# Patient Record
Sex: MALE | Race: WHITE | NOT HISPANIC OR LATINO
[De-identification: names, ages, dates, MRNs, and addresses within clinical notes are randomized per-mention and may not be internally consistent; named-entity substitution may affect disease eponyms.]

---

## 2021-01-15 ENCOUNTER — TRANSCRIPTION ENCOUNTER (OUTPATIENT)
Age: 86
End: 2021-01-15

## 2021-02-12 ENCOUNTER — TRANSCRIPTION ENCOUNTER (OUTPATIENT)
Age: 86
End: 2021-02-12

## 2023-02-03 ENCOUNTER — INPATIENT (INPATIENT)
Facility: HOSPITAL | Age: 88
LOS: 1 days | DRG: 308 | End: 2023-02-05
Attending: INTERNAL MEDICINE | Admitting: INTERNAL MEDICINE
Payer: MEDICARE

## 2023-02-03 VITALS
SYSTOLIC BLOOD PRESSURE: 116 MMHG | DIASTOLIC BLOOD PRESSURE: 73 MMHG | HEART RATE: 81 BPM | RESPIRATION RATE: 15 BRPM | OXYGEN SATURATION: 100 %

## 2023-02-03 DIAGNOSIS — I46.9 CARDIAC ARREST, CAUSE UNSPECIFIED: ICD-10-CM

## 2023-02-03 LAB
ALBUMIN SERPL ELPH-MCNC: 3.4 G/DL — SIGNIFICANT CHANGE UP (ref 3.3–5)
ALP SERPL-CCNC: 72 U/L — SIGNIFICANT CHANGE UP (ref 40–120)
ALT FLD-CCNC: 49 U/L — HIGH (ref 10–45)
ANION GAP SERPL CALC-SCNC: 23 MMOL/L — HIGH (ref 5–17)
APTT BLD: 30.5 SEC — SIGNIFICANT CHANGE UP (ref 27.5–35.5)
AST SERPL-CCNC: 99 U/L — HIGH (ref 10–40)
BASE EXCESS BLDV CALC-SCNC: -11.5 MMOL/L — LOW (ref -2–3)
BASE EXCESS BLDV CALC-SCNC: -6.4 MMOL/L — LOW (ref -2–3)
BASOPHILS # BLD AUTO: 0.1 K/UL — SIGNIFICANT CHANGE UP (ref 0–0.2)
BASOPHILS NFR BLD AUTO: 0.7 % — SIGNIFICANT CHANGE UP (ref 0–2)
BILIRUB SERPL-MCNC: 0.7 MG/DL — SIGNIFICANT CHANGE UP (ref 0.2–1.2)
BUN SERPL-MCNC: 39 MG/DL — HIGH (ref 7–23)
CA-I SERPL-SCNC: 1.08 MMOL/L — LOW (ref 1.15–1.33)
CA-I SERPL-SCNC: 1.18 MMOL/L — SIGNIFICANT CHANGE UP (ref 1.15–1.33)
CALCIUM SERPL-MCNC: 9.3 MG/DL — SIGNIFICANT CHANGE UP (ref 8.4–10.5)
CHLORIDE BLDV-SCNC: 93 MMOL/L — LOW (ref 96–108)
CHLORIDE BLDV-SCNC: 94 MMOL/L — LOW (ref 96–108)
CHLORIDE SERPL-SCNC: 96 MMOL/L — SIGNIFICANT CHANGE UP (ref 96–108)
CO2 BLDV-SCNC: 22 MMOL/L — SIGNIFICANT CHANGE UP (ref 22–26)
CO2 BLDV-SCNC: 24 MMOL/L — SIGNIFICANT CHANGE UP (ref 22–26)
CO2 SERPL-SCNC: 16 MMOL/L — LOW (ref 22–31)
CREAT SERPL-MCNC: 1.84 MG/DL — HIGH (ref 0.5–1.3)
EGFR: 35 ML/MIN/1.73M2 — LOW
EOSINOPHIL # BLD AUTO: 0.22 K/UL — SIGNIFICANT CHANGE UP (ref 0–0.5)
EOSINOPHIL NFR BLD AUTO: 1.4 % — SIGNIFICANT CHANGE UP (ref 0–6)
GAS PNL BLDV: 123 MMOL/L — LOW (ref 136–145)
GAS PNL BLDV: 132 MMOL/L — LOW (ref 136–145)
GAS PNL BLDV: SIGNIFICANT CHANGE UP
GLUCOSE BLDV-MCNC: 266 MG/DL — HIGH (ref 70–99)
GLUCOSE BLDV-MCNC: 313 MG/DL — HIGH (ref 70–99)
GLUCOSE SERPL-MCNC: 247 MG/DL — HIGH (ref 70–99)
HCO3 BLDV-SCNC: 20 MMOL/L — LOW (ref 22–29)
HCO3 BLDV-SCNC: 22 MMOL/L — SIGNIFICANT CHANGE UP (ref 22–29)
HCT VFR BLD CALC: 40.9 % — SIGNIFICANT CHANGE UP (ref 39–50)
HCT VFR BLDA CALC: 37 % — LOW (ref 39–51)
HCT VFR BLDA CALC: 40 % — SIGNIFICANT CHANGE UP (ref 39–51)
HGB BLD CALC-MCNC: 12.3 G/DL — LOW (ref 12.6–17.4)
HGB BLD CALC-MCNC: 13.3 G/DL — SIGNIFICANT CHANGE UP (ref 12.6–17.4)
HGB BLD-MCNC: 12.4 G/DL — LOW (ref 13–17)
IMM GRANULOCYTES NFR BLD AUTO: 1.8 % — HIGH (ref 0–0.9)
INR BLD: 1.66 RATIO — HIGH (ref 0.88–1.16)
LACTATE BLDV-MCNC: 10.5 MMOL/L — CRITICAL HIGH (ref 0.5–2)
LACTATE BLDV-MCNC: 7.6 MMOL/L — CRITICAL HIGH (ref 0.5–2)
LYMPHOCYTES # BLD AUTO: 21.6 % — SIGNIFICANT CHANGE UP (ref 13–44)
LYMPHOCYTES # BLD AUTO: 3.31 K/UL — HIGH (ref 1–3.3)
MCHC RBC-ENTMCNC: 30.3 GM/DL — LOW (ref 32–36)
MCHC RBC-ENTMCNC: 30.3 PG — SIGNIFICANT CHANGE UP (ref 27–34)
MCV RBC AUTO: 100 FL — SIGNIFICANT CHANGE UP (ref 80–100)
MONOCYTES # BLD AUTO: 1.48 K/UL — HIGH (ref 0–0.9)
MONOCYTES NFR BLD AUTO: 9.7 % — SIGNIFICANT CHANGE UP (ref 2–14)
NEUTROPHILS # BLD AUTO: 9.92 K/UL — HIGH (ref 1.8–7.4)
NEUTROPHILS NFR BLD AUTO: 64.8 % — SIGNIFICANT CHANGE UP (ref 43–77)
NRBC # BLD: 0 /100 WBCS — SIGNIFICANT CHANGE UP (ref 0–0)
PCO2 BLDV: 57 MMHG — HIGH (ref 42–55)
PCO2 BLDV: 69 MMHG — HIGH (ref 42–55)
PH BLDV: 7.06 — CRITICAL LOW (ref 7.32–7.43)
PH BLDV: 7.2 — LOW (ref 7.32–7.43)
PLATELET # BLD AUTO: 279 K/UL — SIGNIFICANT CHANGE UP (ref 150–400)
PO2 BLDV: 46 MMHG — HIGH (ref 25–45)
PO2 BLDV: 47 MMHG — HIGH (ref 25–45)
POTASSIUM BLDV-SCNC: 4.3 MMOL/L — SIGNIFICANT CHANGE UP (ref 3.5–5.1)
POTASSIUM BLDV-SCNC: >10 MMOL/L — CRITICAL HIGH (ref 3.5–5.1)
POTASSIUM SERPL-MCNC: 3.4 MMOL/L — LOW (ref 3.5–5.3)
POTASSIUM SERPL-SCNC: 3.4 MMOL/L — LOW (ref 3.5–5.3)
PROT SERPL-MCNC: 6.6 G/DL — SIGNIFICANT CHANGE UP (ref 6–8.3)
PROTHROM AB SERPL-ACNC: 19.3 SEC — HIGH (ref 10.5–13.4)
RAPID RVP RESULT: SIGNIFICANT CHANGE UP
RBC # BLD: 4.09 M/UL — LOW (ref 4.2–5.8)
RBC # FLD: 16.6 % — HIGH (ref 10.3–14.5)
SAO2 % BLDV: 60.2 % — LOW (ref 67–88)
SAO2 % BLDV: 67 % — SIGNIFICANT CHANGE UP (ref 67–88)
SARS-COV-2 RNA SPEC QL NAA+PROBE: SIGNIFICANT CHANGE UP
SODIUM SERPL-SCNC: 135 MMOL/L — SIGNIFICANT CHANGE UP (ref 135–145)
WBC # BLD: 15.3 K/UL — HIGH (ref 3.8–10.5)
WBC # FLD AUTO: 15.3 K/UL — HIGH (ref 3.8–10.5)

## 2023-02-03 PROCEDURE — 36620 INSERTION CATHETER ARTERY: CPT

## 2023-02-03 PROCEDURE — 71045 X-RAY EXAM CHEST 1 VIEW: CPT | Mod: 26,76

## 2023-02-03 PROCEDURE — 31500 INSERT EMERGENCY AIRWAY: CPT

## 2023-02-03 PROCEDURE — 93010 ELECTROCARDIOGRAM REPORT: CPT

## 2023-02-03 PROCEDURE — 99291 CRITICAL CARE FIRST HOUR: CPT | Mod: 25

## 2023-02-03 RX ORDER — PROPOFOL 10 MG/ML
25 INJECTION, EMULSION INTRAVENOUS
Qty: 1000 | Refills: 0 | Status: DISCONTINUED | OUTPATIENT
Start: 2023-02-03 | End: 2023-02-05

## 2023-02-03 RX ORDER — FENTANYL CITRATE 50 UG/ML
0.5 INJECTION INTRAVENOUS
Qty: 5000 | Refills: 0 | Status: DISCONTINUED | OUTPATIENT
Start: 2023-02-03 | End: 2023-02-03

## 2023-02-03 RX ORDER — LIDOCAINE HCL 20 MG/ML
1 VIAL (ML) INJECTION
Qty: 2 | Refills: 0 | Status: DISCONTINUED | OUTPATIENT
Start: 2023-02-03 | End: 2023-02-05

## 2023-02-03 RX ORDER — CHLORHEXIDINE GLUCONATE 213 G/1000ML
15 SOLUTION TOPICAL EVERY 12 HOURS
Refills: 0 | Status: DISCONTINUED | OUTPATIENT
Start: 2023-02-03 | End: 2023-02-05

## 2023-02-03 RX ORDER — CHLORHEXIDINE GLUCONATE 213 G/1000ML
1 SOLUTION TOPICAL AT BEDTIME
Refills: 0 | Status: DISCONTINUED | OUTPATIENT
Start: 2023-02-03 | End: 2023-02-05

## 2023-02-03 RX ORDER — FENTANYL CITRATE 50 UG/ML
50 INJECTION INTRAVENOUS ONCE
Refills: 0 | Status: DISCONTINUED | OUTPATIENT
Start: 2023-02-03 | End: 2023-02-03

## 2023-02-03 RX ADMIN — FENTANYL CITRATE 2 MICROGRAM(S)/KG/HR: 50 INJECTION INTRAVENOUS at 20:28

## 2023-02-03 RX ADMIN — FENTANYL CITRATE 50 MICROGRAM(S): 50 INJECTION INTRAVENOUS at 20:35

## 2023-02-03 RX ADMIN — Medication 7.5 MG/MIN: at 23:55

## 2023-02-03 NOTE — ED PROVIDER NOTE - PHYSICAL EXAMINATION
General: intubated, withdraws to painful stimuli   Skin: no rash, no pallor  Head: normocephalic, atraumatic  Eyes: clear conjunctiva, pupils fixed, 4 mm b/l   ENMT: intubated, no nasal discharge  Cardiovascular: normal rate, normal rhythm, S1/S2  Pulmonary: absent R side breath sounds   Abdomen: soft, nondistended  Musculoskeletal: moving extremities from painful stimuli, no deformity  Psych: intubated, not verbally responsive

## 2023-02-03 NOTE — ED PROVIDER NOTE - PROGRESS NOTE DETAILS
Mason Recio, PGY-3- micu and ccu consulted. pt s/p vifb arrest and accepted to ccu. ct head/cta chest pending

## 2023-02-03 NOTE — H&P ADULT - HISTORY OF PRESENT ILLNESS
87 year old man with history of CHF, s/p PPM, DM brought in by EMS following cardiac arrest at home. Was seated in chair when he suddenly collapsed as witnessed by his wife and was noted to not be breathing or have a pulse. Wife then started CPR, EMS was contacted and upon arrival was assessed to be in VF. Was shocked twice, given three rounds of epi and had ROSC. Intubated in the field and arrived at the ED with pulse. Per family patient also recently admitted to [facility] for HF exacerbation.     In the ED, had ETT changed and was then noted to not have lung sounds on the right side. Was found to have moderate sized pneumo on CXR, now s/p right-sided chest tube with reinflation of lung seen on CXR. Patient sedated on fent and admitted to CICU for further management s/p VF arrest.   87 year old man with history of HFrEF last EF 40-45%, Afib/Aflutter s/p ablation, high grade AV block s/p PPM, T2DM, CKD brought in by EMS following cardiac arrest at home. Was seated in chair when he suddenly collapsed as witnessed by his wife and was noted to not be breathing or have a pulse. Wife then started CPR, EMS was contacted and upon arrival was assessed to be in VF. Was shocked twice, given three rounds of epi and had ROSC. Intubated in the field and arrived at the ED with pulse. Per family patient also recently admitted to [facility] for HF exacerbation, found to have PNA for which he was treated. Per wife, patient had checked his blood sugar prior to arrest and was 135.     In the ED, had ETT changed and was then noted to not have lung sounds on the right side. Was found to have moderate sized pneumo on CXR, now s/p right-sided chest tube with reinflation of lung seen on CXR. Patient sedated on fent and admitted to CICU for further management s/p VF arrest.   87 year old man with history of HFrEF last EF 40-45%, Afib/Aflutter s/p ablation, high grade AV block s/p PPM, T2DM, CKD brought in by EMS following cardiac arrest at home. Was seated in chair when he suddenly collapsed as witnessed by his wife and was noted to not be breathing or have a pulse. Wife then started CPR, EMS was contacted and upon arrival was assessed to be in VF. Was shocked twice, given three rounds of epi and had ROSC. Intubated in the field and arrived at the ED with pulse. Per family patient also recently admitted to [facility] for HF exacerbation, found to have PNA for which he was treated. Per wife, patient had checked his blood sugar prior to arrest and was 135. Patient's wife believes she called for EMS maybe 5-10 minutes after he went unresponsive.    In the ED, had ETT changed and was then noted to not have lung sounds on the right side. Was found to have moderate sized pneumo on CXR, now s/p right-sided chest tube with reinflation of lung seen on CXR. Patient was sedated on fent and admitted to CICU for further management s/p VF arrest.   87 year old man with history of HFrEF last EF 40-45%, Afib/Aflutter s/p ablation, high grade AV block s/p PPM, T2DM, CKD brought in by EMS following cardiac arrest at home. Was seated in chair when he suddenly collapsed as witnessed by his wife and was noted to not be breathing or have a pulse. Per wife he slumped in chair, did not fall and hit his head. Wife started CPR, EMS was contacted and upon arrival was assessed to be in VF. Was shocked twice, given three rounds of epi and had ROSC. Intubated in the field and arrived at the ED with pulse. Per family patient also recently admitted to [facility] for HF exacerbation, found to have PNA for which he was treated. Per wife, patient had checked his blood sugar prior to arrest and was 135. Patient's wife believes she called for EMS maybe 5-10 minutes after he went unresponsive.    In the ED, had ETT changed and was then noted to not have lung sounds on the right side. Was found to have moderate sized pneumo on CXR, now s/p right-sided chest tube with reinflation of lung seen on CXR. Patient was sedated on fent and admitted to CICU for further management s/p VF arrest.   87 year old man with history of HFrEF last EF 40-45%, Afib/Aflutter s/p ablation, high grade AV block s/p PPM, T2DM, CKD brought in by EMS following cardiac arrest at home. Was seated in chair when he suddenly collapsed as witnessed by his wife and was noted to not be breathing or have a pulse. Per wife he slumped in chair, did not fall and hit his head. Wife started CPR, EMS was contacted and upon arrival was assessed to be in VF. Was shocked twice, given three rounds of epi and had ROSC. Intubated in the field and arrived at the ED with pulse. Per family patient also recently admitted to [facility] for HF exacerbation, found to have PNA for which he was treated. Per wife, patient had checked his blood sugar prior to arrest and was 135. Patient's wife believes she called for EMS maybe 5-10 minutes after he went unresponsive.    In the ED, noted to have /73, HR 81, 100% saturation. Elevated WBC to 15.3, HAGMA with lactate of 10.5 on VBG, Pro-BNP of 5819. He had ETT changed and was then noted to not have lung sounds on the right side. Was found to have moderate sized pneumo on CXR, now s/p right-sided chest tube with reinflation of lung seen on CXR. Patient was sedated on fent and admitted to CICU for further management s/p VF arrest.

## 2023-02-03 NOTE — H&P ADULT - NSHPPHYSICALEXAM_GEN_ALL_CORE
Vital Signs Last 24 Hrs  T(C): 37.1 (03 Feb 2023 21:06), Max: 37.1 (03 Feb 2023 21:06)  T(F): 98.8 (03 Feb 2023 21:06), Max: 98.8 (03 Feb 2023 21:06)  HR: 106 (03 Feb 2023 21:06) (81 - 106)  BP: 136/87 (03 Feb 2023 21:06) (116/73 - 136/87)  BP(mean): 99 (03 Feb 2023 21:06) (87 - 108)  RR: 18 (03 Feb 2023 21:06) (15 - 26)  SpO2: 100% (03 Feb 2023 21:06) (95% - 100%)    Parameters below as of 03 Feb 2023 21:06  Patient On (Oxygen Delivery Method): ventilator    CONSTITUTIONAL: NAD, well-developed, well-groomed  HEENT: Moist oral mucosa, no pharyngeal injection or exudates  RESPIRATORY: Normal respiratory effort, lungs are clear to auscultation bilaterally  CARDIOVASCULAR: Regular rate and rhythm, normal S1 and S2, no murmur/rub/gallop, no lower extremity edema, peripheral pulses are 2+ bilaterally  ABDOMEN: Soft, nondistended, nontender to palpation, normoactive bowel sounds, no rebound/guarding  PSYCH: A+O to person, place, and time  NEUROLOGY: Facial expression symmetric, no gross sensory deficits appreciated, moves all extremities spontaneously  SKIN: No rashes, no palpable lesions Vital Signs Last 24 Hrs  T(C): 37.1 (03 Feb 2023 21:06), Max: 37.1 (03 Feb 2023 21:06)  T(F): 98.8 (03 Feb 2023 21:06), Max: 98.8 (03 Feb 2023 21:06)  HR: 106 (03 Feb 2023 21:06) (81 - 106)  BP: 136/87 (03 Feb 2023 21:06) (116/73 - 136/87)  BP(mean): 99 (03 Feb 2023 21:06) (87 - 108)  RR: 18 (03 Feb 2023 21:06) (15 - 26)  SpO2: 100% (03 Feb 2023 21:06) (95% - 100%)    Parameters below as of 03 Feb 2023 21:06  Patient On (Oxygen Delivery Method): ventilator    CONSTITUTIONAL: Sedated  HEENT: ETT in place  RESPIRATORY: Intubated, ventilated. Lung sounds bilaterally  CARDIOVASCULAR: Regular rate and rhythm, normal S1 and S2, no murmur/rub/gallop, no lower extremity edema, peripheral pulses are 1+ bilaterally  ABDOMEN: Soft, nondistended, nontender to palpation, normoactive bowel sounds, no rebound/guarding  PSYCH: Sedated  NEUROLOGY: Sedated, intermittent jerking of upper extremities  SKIN: No rashes, no palpable lesions Vital Signs Last 24 Hrs  T(C): 37.1 (03 Feb 2023 21:06), Max: 37.1 (03 Feb 2023 21:06)  T(F): 98.8 (03 Feb 2023 21:06), Max: 98.8 (03 Feb 2023 21:06)  HR: 106 (03 Feb 2023 21:06) (81 - 106)  BP: 136/87 (03 Feb 2023 21:06) (116/73 - 136/87)  BP(mean): 99 (03 Feb 2023 21:06) (87 - 108)  RR: 18 (03 Feb 2023 21:06) (15 - 26)  SpO2: 100% (03 Feb 2023 21:06) (95% - 100%)    Parameters below as of 03 Feb 2023 21:06  Patient On (Oxygen Delivery Method): ventilator    CONSTITUTIONAL: Sedated  HEENT: ETT in place  RESPIRATORY: Intubated, ventilated. Lung sounds bilaterally, mildly diminished still on right side. Chest tube on right side  CARDIOVASCULAR: Irregular rhythm, normal S1 and S2, no murmur/rub/gallop, no lower extremity edema appreciated, peripheral pulses are 1+ bilaterally  ABDOMEN: Soft, nondistended, bowel sounds heard, no rebound/guarding  PSYCH: Sedated  NEUROLOGY: Sedated, initially with intermittent jerking of upper extremities  SKIN: No rashes, no palpable lesions Vital Signs Last 24 Hrs  T(C): 37.1 (03 Feb 2023 21:06), Max: 37.1 (03 Feb 2023 21:06)  T(F): 98.8 (03 Feb 2023 21:06), Max: 98.8 (03 Feb 2023 21:06)  HR: 106 (03 Feb 2023 21:06) (81 - 106)  BP: 136/87 (03 Feb 2023 21:06) (116/73 - 136/87)  BP(mean): 99 (03 Feb 2023 21:06) (87 - 108)  RR: 18 (03 Feb 2023 21:06) (15 - 26)  SpO2: 100% (03 Feb 2023 21:06) (95% - 100%)    Parameters below as of 03 Feb 2023 21:06  Patient On (Oxygen Delivery Method): ventilator    CONSTITUTIONAL: Sedated  HEENT: ETT in place  RESPIRATORY: Intubated, ventilated. Lung sounds bilaterally, mildly diminished still on right side. Pigtail catheter on right side  CARDIOVASCULAR: Irregular rhythm, normal S1 and S2, no murmur/rub/gallop, no lower extremity edema appreciated, peripheral pulses are 1+ bilaterally  ABDOMEN: Soft, nondistended, bowel sounds heard, no rebound/guarding  PSYCH: Sedated  NEUROLOGY: Sedated, initially with intermittent jerking of upper extremities  SKIN: No rashes, no palpable lesions Vital Signs Last 24 Hrs  T(C): 37.1 (03 Feb 2023 21:06), Max: 37.1 (03 Feb 2023 21:06)  T(F): 98.8 (03 Feb 2023 21:06), Max: 98.8 (03 Feb 2023 21:06)  HR: 106 (03 Feb 2023 21:06) (81 - 106)  BP: 136/87 (03 Feb 2023 21:06) (116/73 - 136/87)  BP(mean): 99 (03 Feb 2023 21:06) (87 - 108)  RR: 18 (03 Feb 2023 21:06) (15 - 26)  SpO2: 100% (03 Feb 2023 21:06) (95% - 100%)    Parameters below as of 03 Feb 2023 21:06  Patient On (Oxygen Delivery Method): ventilator    CONSTITUTIONAL: Sedated  HEENT: ETT in place  RESPIRATORY: Intubated, ventilated. Lung sounds bilaterally, mildly diminished still on right side. Pigtail catheter at right chest wall  CARDIOVASCULAR: Irregular rhythm, normal S1 and S2, no murmur/rub/gallop, no lower extremity edema appreciated, peripheral pulses are 1+ bilaterally  ABDOMEN: Soft, nondistended, bowel sounds heard, no rebound/guarding  PSYCH: Sedated  NEUROLOGY: Sedated, initially with intermittent jerking of upper extremities  SKIN: No rashes, no palpable lesions Vital Signs Last 24 Hrs  T(C): 37.1 (03 Feb 2023 21:06), Max: 37.1 (03 Feb 2023 21:06)  T(F): 98.8 (03 Feb 2023 21:06), Max: 98.8 (03 Feb 2023 21:06)  HR: 106 (03 Feb 2023 21:06) (81 - 106)  BP: 136/87 (03 Feb 2023 21:06) (116/73 - 136/87)  BP(mean): 99 (03 Feb 2023 21:06) (87 - 108)  RR: 18 (03 Feb 2023 21:06) (15 - 26)  SpO2: 100% (03 Feb 2023 21:06) (95% - 100%)    Parameters below as of 03 Feb 2023 21:06  Patient On (Oxygen Delivery Method): ventilator    CONSTITUTIONAL: Sedated  HEENT: ETT in place  RESPIRATORY: Intubated, ventilated. Lung sounds bilaterally, mildly diminished still on right side. Pigtail catheter at right chest wall  CARDIOVASCULAR: Irregular rhythm, normal S1 and S2, no murmur/rub/gallop, no lower extremity edema appreciated, peripheral pulses are 1+ bilaterally  ABDOMEN: Soft, nondistended, bowel sounds heard, no rebound/guarding  : Beard in place  PSYCH: Sedated  NEUROLOGY: Sedated, initially with intermittent jerking of upper extremities  SKIN: No rashes, no palpable lesions

## 2023-02-03 NOTE — ED PROCEDURE NOTE - CPROC ED TIME OUT STATEMENT1
“Patient's name, , procedure and correct site were confirmed during the [facility] Timeout.”
“Patient's name, , procedure and correct site were confirmed during the [facility] Timeout.”

## 2023-02-03 NOTE — ED PROCEDURE NOTE - CPROC ED INDICATIONS1
cardiac arrest
R side absent breath spounds/Post-Intubation
airway protection/critical patient
critical patient/pneumothorax

## 2023-02-03 NOTE — ED ADULT NURSE REASSESSMENT NOTE - NS ED NURSE REASSESS COMMENT FT1
Indwelling fischer placed for monitoring output of critically ill patient. Fischer placed with 2nd RN Tiso using sterile technique. Fischer draining clear yellow urine

## 2023-02-03 NOTE — H&P ADULT - NSHPLABSRESULTS_GEN_ALL_CORE
12.4   15.30 )-----------( 279      ( 03 Feb 2023 20:19 )             40.9     02-03    135  |  96  |  39<H>  ----------------------------<  247<H>  3.4<L>   |  16<L>  |  1.84<H>    Ca    9.3      03 Feb 2023 20:19    TPro  6.6  /  Alb  3.4  /  TBili  0.7  /  DBili  x   /  AST  99<H>  /  ALT  49<H>  /  AlkPhos  72  02-03      PT/INR - ( 03 Feb 2023 20:19 )   PT: 19.3 sec;   INR: 1.66 ratio    PTT - ( 03 Feb 2023 20:19 )  PTT:30.5 sec    21:12 - VBG - pH: 7.20  | pCO2: 57    | pO2: 47    | Lactate: 7.6    19:51 - VBG - pH: 7.06  | pCO2: 69    | pO2: 46    | Lactate: 10.5        < from: Xray Chest 1 View AP/PA (02.03.23 @ 20:58) >    ******PRELIMINARY REPORT******      ******PRELIMINARY REPORT******         ACC: [id number] EXAM:  XR CHEST AP OR PA 1V   ORDERED BY: JULIETA WANG     PROCEDURE DATE:  02/03/2023    ******PRELIMINARY REPORT******      ******PRELIMINARY REPORT******       INTERPRETATION:  CLINICAL INDICATION: Chest tube    TECHNIQUE: Single frontal, portable view of the chest was obtained.    COMPARISON: Chest Radiograph dated 2/3/2023    FINDINGS:    Left-sided dual-lead cardiac pacemaker.  Heart size cannot beassessed on this projection.  Interval placement of right-sided chest tube. Small residual   pneumothorax, decreased from prior.  The visualized osseous structures demonstrate no acute pathology.      IMPRESSION:  Small residual pneumothorax status post right-sided chest tube, decreased   from prior.      ******PRELIMINARY REPORT******      ******PRELIMINARY REPORT******     < end of copied text >

## 2023-02-03 NOTE — ED ADULT NURSE REASSESSMENT NOTE - NS ED NURSE REASSESS COMMENT FT1
MD at bedside preparing to place chest tube after chest x-ray confirmed pneumothorax on R side, pt begins to move so as per MD Recio increase of Fentanyl drip from 0.5mcg/kg to 1.0mcg/kg @2026, pt also given an IVP of Fentanyl 50mcg as per MD Recio prior to start of chest tube placement @2035.

## 2023-02-03 NOTE — ED PROCEDURE NOTE - CPROC ED INFORMED CONSENT1
This was an emergent procedure and consent was implied.
This was an emergent procedure and consent was implied.
Benefits, risks, and possible complications of procedure explained to patient/caregiver who verbalized understanding and gave verbal consent.
Benefits, risks, and possible complications of procedure explained to patient/caregiver who verbalized understanding and gave verbal consent.

## 2023-02-03 NOTE — ED PROVIDER NOTE - DIFFERENTIAL DIAGNOSIS
DDx includes but is not limited to-  cardiac arrest, arrythmia, electrolyte abnormality, PE Differential Diagnosis

## 2023-02-03 NOTE — ED ADULT NURSE NOTE - NS ED NURSE TRANSPORT WITH
Cardiac Monitor/Defib/ACLS/Rescue Kit/O2/BVM/oxygen/IV pump/ventilator Cardiac Monitor/Defib/ACLS/Rescue Kit/O2/BVM/oxygen/IV pump/pulse ox/ventilator/drains/ACLS Rescue Kit

## 2023-02-03 NOTE — ED PROVIDER NOTE - ATTENDING CONTRIBUTION TO CARE
87-year-old male with past medical history of congestive heart failure, DM, is brought to ED by EMS from home for evaluation of witnessed cardiac arrest.  Patient presented status postcardiac arrest with V. fib shocked several times and received 3 A of epi with ROSC patient has a permanent pacemaker with a paced rhythm on arrival stable blood pressure intubated ET tube was changed as it was extremely deep and the cuff was not inflating.  After ET tube was changed without any complications noted to have no breath sounds on the right side ultrasound confirmed no sliding chest x-ray with moderate pneumothorax on the right ET tube in good place right-sided chest tube was placed without complication.  Repeat chest x-ray shows reinflated lung discussed with family CCU MICU evaluated patient patient stable for CCU bedside echo with poor squeeze but not requiring any pressors at this time.

## 2023-02-03 NOTE — H&P ADULT - ASSESSMENT
87 year old man with history of CHF, s/p PPM, DM brought in by EMS following cardiac arrest at home. Found to have VF s/p defib x 2, epi x 3 with ROSC and with course complicated by right-sided pneumothorax s/p chest tube. Patient admitted to CICU for further management post VF arrest.    Neuro  #Sedated  - Sedated on fentanyl currently, will switch to prop  - Wean as tolerated to assess mental status per ICU protocol      Respiratory  #Intubated  - Intubated in the field for airway protection s/p VFib arrest  - Current vent settings:  - SBTs as tolerated  - Follow ABGs for vent adjustment as needed    #Right-sided Pneumothorax  - Found to have right-sided pneumothorax on CXR s/p intubation  - Now s/p chest tube with reinflation, residual pneumo on CXR. Monitor CXR for resolution    Cardiovascular  #VFib arrest  - S/P defib x2, epi x3  with EMS  - Monitor on tele  - Interrogate PPM to assess for possible arrhythmia as cause of arrest  - Follow ischemic workup  - F/U TTE  - Hemodynamically stable at this time, not requiring pressor support    Renal  #YESICA  - Elevated Cr to 1.84, unknown baseline  - Monitor I&Os  - Trend Cr  - Possibly ATN iso VFib arrest with hypoperfusion  - Caution with fluids iso heart failure history    #Lactic acidemia, HAGMA  - VBG with pH of 7.20 most recently, lactate of 10.5 on admission, now downtrending, most recently 7.5  - Likely iso cardiac arrest  - Continue to monitor for clearance    GI  #NPO  - NPO for now iso intubation  - Initiate tube feeds when patient more stable. Will also assess for stability for extubation tomorrow    Endocrine  #DM  - FS q6hr while NPO with ISS for correction    ID  - Afebrile with elevated WBC, likely reactive iso VFib arrest  - Continue to monitor WBC count post arrest and monitor for febrile episodes  - Hold off on antibiotics for now    Heme  - No active issues, continue to monitor    Full code per family   87 year old man with history of HFrEF last EF 40-45%, Afib/Aflutter s/p ablation, high grade AV block s/p PPM, T2DM, CKD brought in by EMS following cardiac arrest at home. Found to have VF s/p defib x 2, epi x 3 with ROSC and with course complicated by right-sided pneumothorax s/p chest tube. Patient admitted to CICU for further management post VF arrest.    Neuro  #Sedated  - Sedated on fentanyl in the ED, plan to wean down and uptitrate onto fent  - Wean as tolerated to assess mental status per ICU protocol  - F/U CTH given noted upper extremity intermittent jerking movements. ?anoxia. Estimated down time per wife,        Respiratory  #Intubated  - Intubated in the field for airway protection s/p VFib arrest  - Current vent settings:  - SBTs as tolerated  - Follow ABGs for vent adjustment as needed    #Right-sided Pneumothorax  - Found to have right-sided pneumothorax on CXR s/p intubation  - Now s/p chest tube with reinflation, residual pneumo on CXR. Monitor CXR for resolution      Cardiovascular  #VFib arrest  - S/P defib x2, epi x3  with EMS  - Monitor on tele  - Interrogate PPM to assess for possible arrhythmia as cause of arrest  - Follow up ischemic workup  - F/U TTE  - Hemodynamically stable at this time, not requiring pressor support  - Currently in paced rhythm  - F/U lipid profile    Renal  #YESICA  - Elevated Cr to 1.84, unknown baseline. Per family does have history of CKD  - Monitor I&Os  - Trend Cr  - Possibly ATN iso VFib arrest with hypoperfusion  - Caution with fluids iso heart failure history    #Lactic acidemia, HAGMA  - VBG with pH of 7.20 most recently, lactate of 10.5 on admission, now downtrending, most recently 7.5  - Likely iso cardiac arrest  - Continue to monitor for clearance    GI  #NPO  - NPO for now iso intubation  - Initiate tube feeds when patient more stable. Will also assess for stability for extubation tomorrow    Endocrine  #T2DM  - FS q6hr while NPO with ISS for correction  - F/U A1C      ID  - Afebrile with elevated WBC, likely reactive iso VFib arrest  - Continue to monitor WBC count post arrest and monitor for febrile episodes  - Hold off on antibiotics for now    Heme  - No active issues, continue to monitor    Full code per family   87 year old man with history of HFrEF last EF 40-45%, Afib/Aflutter s/p ablation, high grade AV block s/p PPM, T2DM, CKD brought in by EMS following cardiac arrest at home. Found to have VF s/p defib x 2, epi x 3 with ROSC and with course complicated by right-sided pneumothorax s/p chest tube. Patient admitted to CICU for further management post VF arrest.    Neuro  #Sedated  - Sedated on fentanyl in the ED, plan to wean down and uptitrate onto fent  - Wean as tolerated to assess mental status per ICU protocol  - F/U CTH given initial noted upper extremity intermittent jerking movements. ?anoxia. Per wife she had initiated CPR pretty immediately and called for EMS within 5-10 minutes      Respiratory  #Intubated  - Intubated in the field for airway protection s/p VFib arrest  - Current vent settings: 16/400/6/40  - SBTs as tolerated  - Follow ABGs for vent adjustment as needed    #Right-sided Pneumothorax  - Found to have right-sided pneumothorax on CXR s/p intubation  - S/P chest tube with reinflation, residual pneumo on CXR. Monitor CXR for resolution. Chest tube to low wall suction for now      Cardiovascular  #VFib arrest  - S/P defib x2, epi x3  with EMS  - Monitor on tele  - On PPM interrogation appears to have had episode of VT around ~1900. Starting lidocaine at rate of 1mg/min  - Follow up ischemic workup  - F/U TTE  - Hemodynamically stable at this time, not requiring pressor support  - Currently in paced rhythm  - F/U lipid profile        Renal  #YESICA  - Elevated Cr to 1.84, unknown baseline. Per family does have history of CKD  - Monitor I&Os  - Trend Cr  - Possibly ATN iso VFib arrest with hypoperfusion  - Caution with fluids iso heart failure history    #Lactic acidemia, HAGMA  - VBG with pH of 7.20 most recently, lactate of 10.5 on admission, now downtrending, most recently 7.5  - Likely iso cardiac arrest  - Continue to trend for clearance    GI  - Dullness to percussion on RLQ, but is soft to palpation. F/U abdominal xray  - NPO for now iso intubation  - Initiate tube feeds when patient more stable.    Endocrine  #T2DM  - FS q6hr while NPO with ISS for correction  - F/U A1C  - Prior to arrest had sugar of 135 per wife      ID  - Afebrile with elevated WBC, likely reactive iso VFib arrest  - Continue to monitor WBC count post arrest and monitor for febrile episodes  - Hold off on antibiotics for now    Heme  - No active issues, continue to monitor    Full code per family   87 year old man with history of HFrEF last EF 40-45%, Afib/Aflutter s/p ablation, high grade AV block s/p PPM, T2DM, CKD brought in by EMS following cardiac arrest at home. Found to have VF s/p defib x 2, epi x 3 with ROSC and with course complicated by right-sided pneumothorax s/p chest tube. Patient admitted to CICU for further management post VF arrest.    Neuro  #Sedated  - Sedated on fentanyl in the ED, transitioned to prop  - Wean as tolerated to assess mental status per ICU protocol    #Upper extremity myoclonic jerks  - F/U CTH given initial noted upper extremity intermittent jerking movements. ?anoxia. Per wife she had initiated CPR pretty immediately and called for EMS within 5-10 minutes  - vEEG ordered      Respiratory  #Intubated  - Intubated in the field for airway protection s/p VFib arrest  - Current vent settings: 16/400/6/40  - SBTs as tolerated  - Follow ABGs for vent adjustment as needed    #Right-sided Pneumothorax  - Found to have right-sided pneumothorax on CXR s/p intubation  - S/P chest tube with reinflation, residual pneumo on CXR. Monitor CXR for resolution. Chest tube to low wall suction for now      Cardiovascular  #VFib arrest  - S/P defib x2, epi x3  with EMS  - Monitor on tele  - On PPM interrogation appears to have had episode of VT around ~1900. Starting lidocaine at rate of 1mg/min  - Follow up ischemic workup  - F/U TTE  - Not requiring pressor support at this time while sedated  - Currently in paced rhythm  - F/U lipid profile    #AFib/Flutter  - Per outpatient cardiologist patient is supposed to be on carvedilol. Had been taken off meds when he was admitted at [facility] for PNA iso hypotension and required midodrine  - Hold carvedilol iso low blood pressures, likely vasoplegia while on prop. Continue to monitor for ability to restart  - On eliquis 2.5mg BID at home, will initiate heparin drip for AC    #HFrEF  - Per outpatient cardiologist was on SGLT2i, spironolactone, Entresto lasix, but was taken off while in the hospital due to hypotension  - Most recent medications per cardiologist were midodrine, lasix, alogliptin and metformin, eliquis 2.5mg BID    #CAD s/p stents  - 14 stents per family, unsure of last stent placement      Renal  #CKD  - Cr of 1.84, per outpatient cardiologist usually is around 1.5-2.0  - Monitor I&Os    #Lactic acidemia, HAGMA  - VBG with pH of 7.20 most recently, lactate of 10.5 on admission, now downtrending, most recently 7.5  - Likely iso cardiac arrest  - Continue to trend for clearance      GI  - Dullness to percussion on RLQ, but is soft to palpation. F/U abdominal xray  - NPO for now iso intubation  - Initiate tube feeds when patient more stable.      Endocrine  #T2DM  - FS q6hr while NPO with ISS for correction  - F/U A1C  - Prior to arrest had sugar of 135 per wife      ID  - Afebrile with elevated WBC, likely reactive iso VFib arrest  - Continue to monitor WBC count post arrest and monitor for febrile episodes  - Hold off on antibiotics for now    Heme  - No active issues, continue to monitor    Full code per family   87 year old man with history of HFrEF last EF 40-45%, Afib/Aflutter s/p ablation, high grade AV block s/p PPM, T2DM, CKD brought in by EMS following cardiac arrest at home. Found to have VF s/p defib x 2, epi x 3 with ROSC and with course complicated by right-sided pneumothorax s/p chest tube. Patient admitted to CICU for further management post VF arrest.    Neuro  #Sedated  - Sedated on fentanyl in the ED, transitioned to prop  - Wean as tolerated to assess mental status per ICU protocol    #Upper extremity myoclonic jerks  - F/U CTH given initial noted upper extremity intermittent jerking movements. ?anoxia. Per wife she had initiated CPR pretty immediately and called for EMS within 5-10 minutes  - vEEG ordered      Respiratory  #Intubated  - Intubated in the field for airway protection s/p VFib arrest. Tube exchanged while in the ED.  - Current vent settings: 16/400/6/40  - SBTs as tolerated  - Follow ABGs for vent adjustment as needed    #Right-sided Pneumothorax  - Found to have right-sided pneumothorax on CXR possibly iso intubation versus CPR  - S/P chest tube with reinflation, residual pneumo on CXR. Monitor CXR for resolution. Chest tube to low wall suction for now      Cardiovascular  #VFib arrest  - S/P defib x2, epi x3  with EMS  - Monitor on tele  - On PPM interrogation appears to have had episode of VT around ~1900. Starting lidocaine at rate of 1mg/min  - Follow up ischemic workup  - F/U TTE  - Not requiring pressor support at this time while sedated  - Currently in paced rhythm  - F/U lipid profile    #AFib/Flutter  - Per outpatient cardiologist patient is supposed to be on carvedilol. Had been taken off meds when he was admitted at [facility] for PNA iso hypotension and required midodrine  - Hold carvedilol iso low blood pressures, likely vasoplegia while on prop. Continue to monitor for ability to restart  - On eliquis 2.5mg BID at home, will initiate heparin drip for AC    #HFrEF  - Per outpatient cardiologist was on SGLT2i, spironolactone, Entresto lasix, but was taken off while in the hospital due to hypotension  - Most recent medications per cardiologist were midodrine, lasix, alogliptin and metformin, eliquis 2.5mg BID    #CAD s/p stents  - 14 stents per family, unsure of last stent placement  - Will need to follow up on DAPT status      Renal  #CKD  - Cr of 1.84, per outpatient cardiologist usually is around 1.5-2.0  - Monitor I&Os    #Lactic acidemia, HAGMA  - VBG with pH of 7.20 most recently, lactate of 10.5 on admission, now downtrending, most recently 7.5  - Likely iso cardiac arrest  - Continue to trend for clearance      GI  - Dullness to percussion on RLQ, but is soft to palpation. F/U abdominal xray  - NPO for now iso intubation  - Initiate tube feeds when patient more stable.      Endocrine  #T2DM  - FS q6hr while NPO with ISS for correction q6hr  - F/U A1C  - Prior to arrest had sugar of 135 per wife      ID  - Borderline temperature on esophageal probe  - Elevated WBC, still likely reactive iso VFib arrest  - Continue to monitor WBC count post arrest and monitor for febrile episodes  - Hold off on antibiotics for now, but will send blood cultures, UA    Heme  - No active issues, continue to monitor hemoglobin  - Heparin drip for AC given AFib/Flutter history     87 year old man with history of HFrEF last EF 40-45%, Afib/Aflutter s/p ablation, high grade AV block s/p PPM, T2DM, CKD brought in by EMS following cardiac arrest at home. Found to have VF s/p defib x 2, epi x 3 with ROSC and with course complicated by right-sided pneumothorax s/p chest tube. Patient admitted to CICU for further management post VF arrest.    Neuro  #Sedated  - Sedated on fentanyl in the ED, transitioned to prop  - Wean as tolerated to assess mental status per ICU protocol    #Upper extremity myoclonic jerks  - F/U CTH given initial noted upper extremity intermittent jerking movements. ?anoxia. Per wife she had initiated CPR pretty immediately and called for EMS within 5-10 minutes  - vEEG ordered      Respiratory  #Intubated  - Intubated in the field for airway protection s/p VFib arrest. Tube exchanged while in the ED.  - Current vent settings: 16/400/6/40  - SBTs as tolerated  - Follow ABGs for vent adjustment as needed    #Right-sided Pneumothorax  - Found to have right-sided pneumothorax on CXR possibly iso intubation versus CPR  - S/P chest tube with reinflation, residual pneumo on CXR. Monitor CXR for resolution. Chest tube to low wall suction for now      Cardiovascular  #VFib arrest  - S/P defib x2, epi x3  with EMS  - Monitor on tele  - On PPM interrogation appears to have had episode of VT around ~1900. Starting lidocaine at rate of 1mg/min  - Follow up ischemic workup  - F/U TTE  - Not requiring pressor support at this time while sedated  - Currently in paced rhythm  - F/U lipid profile    #AFib/Flutter  - Per outpatient cardiologist patient is supposed to be on carvedilol. Had been taken off meds when he was admitted at [place] for PNA iso hypotension and required midodrine  - Hold carvedilol iso low blood pressures, likely vasoplegia while on prop. Continue to monitor for ability to restart  - On eliquis 2.5mg BID at home, will initiate heparin drip for AC    #HFrEF  - Per outpatient cardiologist was on SGLT2i, spironolactone, Entresto lasix, but was taken off while in the hospital due to hypotension  - Most recent medications per cardiologist were midodrine, lasix, alogliptin and metformin, eliquis 2.5mg BID    #CAD s/p stents  - 14 stents per family, unsure of last stent placement  - Will need to follow up on       Renal  #CKD  - Cr of 1.84, per outpatient cardiologist usually is around 1.5-2.0  - Monitor I&Os    #Lactic acidemia, HAGMA  - VBG with pH of 7.20 most recently, lactate of 10.5 on admission, now downtrending, most recently 7.5  - Anion gap trending down   - Likely iso cardiac arrest  - Continue to trend for clearance, follow ABG for resolution of acidemia      GI  - Dullness to percussion on RLQ, but is soft to palpation. F/U abdominal xray  - NPO for now iso intubation  - Initiate tube feeds when patient more stable.      Endocrine  #T2DM  - FS q6hr while NPO with ISS for correction q6hr  - F/U A1C  - Prior to arrest had sugar of 135 per wife      ID  - Borderline temperature on esophageal probe  - Elevated WBC, still likely reactive iso VFib arrest  - Continue to monitor WBC count post arrest and monitor for febrile episodes  - Hold off on antibiotics for now, but will send blood cultures, UA    Heme  - No active issues, continue to monitor hemoglobin  - Heparin drip for AC given AFib/Flutter history     87 year old man with history of HFrEF last EF 40-45%, Afib/Aflutter s/p ablation, high grade AV block s/p PPM, T2DM, CKD brought in by EMS following cardiac arrest at home. Found to have VF s/p defib x 2, epi x 3 with ROSC and with course complicated by right-sided pneumothorax s/p chest tube. Patient admitted to CICU for further management post VF arrest.    Neuro  #Sedated  - Sedated on fentanyl in the ED, transitioned to prop  - Wean as tolerated to assess mental status per ICU protocol    #Upper extremity myoclonic jerks  - F/U CTH given initial noted upper extremity intermittent jerking movements. ?anoxia. Per wife she had initiated CPR pretty immediately and called for EMS within 5-10 minutes  - vEEG ordered      Respiratory  #Intubated  - Intubated in the field for airway protection s/p VFib arrest. Tube exchanged while in the ED.  - Current vent settings: 16/400/6/40  - SBTs as tolerated  - Follow ABGs for vent adjustment as needed    #Right-sided Pneumothorax  - Found to have right-sided pneumothorax on CXR possibly iso intubation versus CPR  - S/P chest tube with reinflation, residual pneumo on CXR. Monitor CXR for resolution. Chest tube to low wall suction for now      Cardiovascular  #VFib arrest  - Reported VFib by EMS  - S/P defib x2, epi x3  with EMS  - Monitor on tele  - On PPM interrogation appears to have had episode of VT around ~1900. Starting lidocaine at rate of 1mg/min  - Follow up ischemic workup  - F/U TTE  - Not requiring pressor support at this time while sedated  - Currently in paced rhythm  - F/U lipid profile    #AFib/Flutter  - Per outpatient cardiologist patient is supposed to be on carvedilol. Had been taken off meds when he was admitted at [place] for PNA iso hypotension and required midodrine  - Hold carvedilol iso low blood pressures, likely vasoplegia while on prop. Continue to monitor for ability to restart  - On eliquis 2.5mg BID at home, will initiate heparin drip for AC    #HFrEF  - Per outpatient cardiologist was on SGLT2i, spironolactone, Entresto lasix, but was taken off while in the hospital due to hypotension  - Most recent medications per cardiologist were midodrine, lasix, alogliptin and metformin, eliquis 2.5mg BID    #CAD s/p stents  - 14 stents per family, unsure of last stent placement  - Will need to follow up on medication regimen      Renal  #CKD  - Cr of 1.84, per outpatient cardiologist usually is around 1.5-2.0  - Monitor I&Os    #Lactic acidemia, HAGMA  - VBG with pH of 7.20 most recently, lactate of 10.5 on admission, now downtrending, most recently 7.5  - Anion gap trending down   - Likely iso cardiac arrest  - Continue to trend for clearance, follow ABG for resolution of acidemia      GI  - Dullness to percussion on RLQ, but is soft to palpation. F/U abdominal xray  - NPO for now iso intubation  - Initiate tube feeds when patient more stable.      Endocrine  #T2DM  - FS q6hr while NPO with ISS for correction q6hr  - F/U A1C  - Prior to arrest had sugar of 135 per wife      ID  - Borderline temperature on esophageal probe  - Elevated WBC, still likely reactive iso VFib arrest  - Continue to monitor WBC count post arrest and monitor for febrile episodes  - Hold off on antibiotics for now, but will send blood cultures, UA    Heme  - No active issues, continue to monitor hemoglobin  - Heparin drip for AC given AFib/Flutter history     87 year old man with history of HFrEF last EF 40-45%, Afib/Aflutter s/p ablation, high grade AV block s/p PPM, T2DM, CKD brought in by EMS following cardiac arrest at home. Found to have VF s/p defib x 2, epi x 3 with ROSC and with course complicated by right-sided pneumothorax s/p chest tube. Patient admitted to CICU for further management post VF arrest.    Neuro  #Sedated  - Sedated on fentanyl in the ED, transitioned to prop  - Wean as tolerated to assess mental status per ICU protocol    #Upper extremity myoclonic jerks  - F/U CTH given initial noted upper extremity intermittent jerking movements. ?anoxia. Per wife she had initiated CPR pretty immediately and called for EMS within 5-10 minutes  - vEEG ordered      Respiratory  #Intubated  - Intubated in the field for airway protection s/p VFib arrest. Tube exchanged while in the ED.  - Current vent settings: 16/400/6/40  - SBTs as tolerated  - Follow ABGs for vent adjustment as needed    #Right-sided Pneumothorax  - Found to have right-sided pneumothorax on CXR possibly iso intubation versus CPR  - S/P chest tube with reinflation, residual pneumo on CXR. Monitor CXR for resolution. Chest tube to low wall suction for now      Cardiovascular  #VFib arrest  - Reported VFib by EMS  - S/P defib x2, epi x3  with EMS  - Monitor on tele  - On PPM interrogation appears to have had episode of VT around ~1900. Starting lidocaine at rate of 1mg/min  - Follow up ischemic workup  - F/U TTE  - Not requiring pressor support at this time while sedated  - Currently in paced rhythm  - F/U lipid profile  - EP consult    #AFib/Flutter  - Per outpatient cardiologist patient is supposed to be on carvedilol. Had been taken off meds when he was admitted at [place] for PNA iso hypotension and required midodrine  - Hold carvedilol iso low blood pressures, likely vasoplegia while on prop. Continue to monitor for ability to restart  - On eliquis 2.5mg BID at home, will initiate heparin drip for AC    #HFrEF  - Per outpatient cardiologist was on SGLT2i, spironolactone, Entresto lasix, but was taken off while in the hospital due to hypotension  - Most recent medications per cardiologist were midodrine, lasix, alogliptin and metformin, eliquis 2.5mg BID    #CAD s/p stents  - 14 stents per family, unsure of last stent placement  - Will need to follow up on medication regimen      Renal  #CKD  - Cr of 1.84, per outpatient cardiologist usually is around 1.5-2.0  - Monitor I&Os    #Lactic acidemia, HAGMA  - VBG with pH of 7.20 most recently, lactate of 10.5 on admission, now downtrending, most recently 7.5  - Anion gap trending down   - Likely iso cardiac arrest  - Continue to trend for clearance, follow ABG for resolution of acidemia      GI  - Dullness to percussion on RLQ, but is soft to palpation. F/U abdominal xray  - NPO for now iso intubation  - Initiate tube feeds when patient more stable.      Endocrine  #T2DM  - FS q6hr while NPO with ISS for correction q6hr  - F/U A1C  - Prior to arrest had sugar of 135 per wife      ID  - Borderline temperature on esophageal probe  - Elevated WBC, still likely reactive iso VFib arrest  - Continue to monitor WBC count post arrest and monitor for febrile episodes  - Hold off on antibiotics for now, but will send blood cultures, UA    Heme  - No active issues, continue to monitor hemoglobin  - Heparin drip for AC given AFib/Flutter history     87 year old man with history of HFrEF last EF 40-45%, Afib/Aflutter s/p ablation, high grade AV block s/p PPM, T2DM, CKD brought in by EMS following cardiac arrest at home. Found to have VF s/p defib x 2, epi x 3 with ROSC and with course complicated by right-sided pneumothorax s/p chest tube. Patient admitted to CICU for further management post VF arrest.    Neuro  #Sedated  - Sedated on fentanyl in the ED, transitioned to prop  - Wean as tolerated to assess mental status per ICU protocol    #Upper extremity myoclonic jerks  - F/U CTH given initial noted upper extremity intermittent jerking movements. ?anoxia. Per wife she had initiated CPR pretty immediately and called for EMS within 5-10 minutes  - vEEG ordered      Respiratory  #Intubated  - Intubated in the field for airway protection s/p VFib arrest. Tube exchanged while in the ED.  - Current vent settings: 16/400/6/40  - SBTs as tolerated  - Follow ABGs for vent adjustment as needed    #Right-sided Pneumothorax  - Found to have right-sided pneumothorax on CXR possibly iso intubation versus CPR  - S/P pigtail catheter with reinflation, residual pneumo on CXR. Monitor CXR for resolution. Chest tube to low wall suction for now      Cardiovascular  #VFib arrest  - Reported VFib by EMS  - S/P defib x2, epi x3  with EMS  - Monitor on tele  - On PPM interrogation appears to have had episode of VT around ~1900. Starting lidocaine at rate of 1mg/min  - Follow up ischemic workup  - F/U TTE  - Not requiring pressor support at this time while sedated  - Currently in paced rhythm  - F/U lipid profile  - EP consult    #AFib/Flutter  - Per outpatient cardiologist patient is supposed to be on carvedilol. Had been taken off meds when he was admitted at [place] for PNA iso hypotension and required midodrine  - Hold carvedilol iso low blood pressures, likely vasoplegia while on prop. Continue to monitor for ability to restart  - On eliquis 2.5mg BID at home, will initiate heparin drip for AC    #HFrEF  - Per outpatient cardiologist was on SGLT2i, spironolactone, Entresto lasix, but was taken off while in the hospital due to hypotension  - Most recent medications per cardiologist were midodrine, lasix, alogliptin and metformin, eliquis 2.5mg BID    #CAD s/p stents  - 14 stents per family, unsure of last stent placement  - Will need to follow up on medication regimen      Renal  #CKD  - Cr of 1.84, per outpatient cardiologist usually is around 1.5-2.0  - Monitor I&Os    #Lactic acidemia, HAGMA  - VBG with pH of 7.20 most recently, lactate of 10.5 on admission, now downtrending, most recently 7.5  - Anion gap trending down   - Likely iso cardiac arrest  - Continue to trend for clearance, follow ABG for resolution of acidemia      GI  - Dullness to percussion on RLQ, but is soft to palpation. F/U abdominal xray  - NPO for now iso intubation  - Initiate tube feeds when patient more stable.      Endocrine  #T2DM  - FS q6hr while NPO with ISS for correction q6hr  - F/U A1C  - Prior to arrest had sugar of 135 per wife      ID  - Borderline temperature on esophageal probe  - Elevated WBC, still likely reactive iso VFib arrest  - Continue to monitor WBC count post arrest and monitor for febrile episodes  - Hold off on antibiotics for now, but will send blood cultures, UA    Heme  - No active issues, continue to monitor hemoglobin  - Heparin drip for AC given AFib/Flutter history     87 year old man with history of HFrEF last EF 40-45%, Afib/Aflutter s/p ablation, high grade AV block s/p PPM, T2DM, CKD brought in by EMS following cardiac arrest at home. Found to have VF s/p defib x 2, epi x 3 with ROSC and with course complicated by right-sided pneumothorax s/p chest tube. Patient admitted to CICU for further management post VF arrest.    Neuro  #Sedated  - Sedated on fentanyl in the ED, transitioned to prop  - Wean as tolerated to assess mental status per ICU protocol    #Upper extremity myoclonic jerks  - F/U CTH given initial noted upper extremity intermittent jerking movements. ?anoxia. Per wife she had initiated CPR pretty immediately and called for EMS within 5-10 minutes  - vEEG ordered      Respiratory  #Intubated  - Intubated in the field for airway protection s/p VFib arrest. Tube exchanged while in the ED.  - Current vent settings: 16/400/6/40  - SBTs as tolerated  - Follow ABGs for vent adjustment as needed    #Right-sided Pneumothorax  - Found to have right-sided pneumothorax on CXR possibly iso intubation versus CPR  - S/P pigtail catheter with reinflation, residual pneumo on CXR. Monitor CXR for resolution. Chest tube to low wall suction for now      Cardiovascular  #VFib arrest  - Reported VFib by EMS  - S/P defib x2, epi x3  with EMS  - Monitor on tele  - On PPM interrogation appears to have had episode of VT around ~1900. Starting lidocaine at rate of 1mg/min  - F/U TTE  - Not requiring pressor support at this time while sedated  - Currently in paced rhythm  - F/U lipid profile  - EP consult  - Troponin of 64, follow to peak    #AFib/Flutter  - Per outpatient cardiologist patient is supposed to be on carvedilol. Had been taken off meds when he was admitted at [place] for PNA iso hypotension and required midodrine  - Hold carvedilol iso low blood pressures, likely vasoplegia while on prop. Continue to monitor for ability to restart  - On eliquis 2.5mg BID at home, will initiate heparin drip for AC    #HFrEF  - Per outpatient cardiologist was on SGLT2i, spironolactone, Entresto lasix, but was taken off while in the hospital due to hypotension  - Most recent medications per cardiologist were midodrine, lasix, alogliptin and metformin, eliquis 2.5mg BID    #CAD s/p stents  - 14 stents per family, unsure of last stent placement  - Will need to follow up on medication regimen      Renal  #YESICA on CKD  - Cr of 1.84 initially, per outpatient cardiologist usually is around 1.5-2.0  - Most recently ~2.5  - Possibly ATN iso arrest and low flow state  - UOP ~5-10cc/hr thus far  - Monitor I&Os  - S/P 500cc bolus x 2    #Lactic acidemia, HAGMA  - VBG with pH of 7.20 most recently, lactate of 10.5 on admission, currently downtrending  - Anion gap trending down   - Likely iso cardiac arrest  - Continue to trend for resolution, follow ABG for resolution of acidemia      GI  - Dullness to percussion on RLQ, but is soft to palpation. F/U abdominal xray  - NPO for now iso intubation  - Initiate tube feeds when patient more stable.    #Elevated Liver Enzymes  - Elevated LFTs, likely iso low flow state  - Continue to monitor      Endocrine  #T2DM  - FS q6hr while NPO with ISS for correction q6hr, 3U NPH q6hr  - A1C 7.1  - Prior to arrest had sugar of 135 per wife    ID  - Borderline temperature on esophageal probe  - Elevated WBC, still likely reactive iso VFib arrest  - Continue to monitor WBC count post arrest and monitor for febrile episodes  - Hold off on antibiotics for now, but will send blood cultures, UA    Heme  - No active issues, continue to monitor hemoglobin  - Heparin drip for AC given AFib/Flutter history     87 year old man with history of HFrEF last EF 40-45%, Afib/Aflutter s/p ablation, high grade AV block s/p PPM, T2DM, CKD brought in by EMS following cardiac arrest at home. Found to have VF s/p defib x 2, epi x 3 with ROSC and with course complicated by right-sided pneumothorax s/p chest tube. Patient admitted to CICU for further management post VF arrest.    Neuro  #Sedated  - Sedated on fentanyl in the ED, transitioned to prop  - Wean as tolerated to assess mental status per ICU protocol    #Upper extremity myoclonic jerks  - F/U CTH given initial noted upper extremity intermittent jerking movements. ?anoxia. Per wife she had initiated CPR pretty immediately and called for EMS within 5-10 minutes  - vEEG ordered      Respiratory  #Intubated  - Intubated in the field for airway protection s/p VFib arrest. Tube exchanged while in the ED.  - Current vent settings: 16/400/6/40  - SBTs as tolerated  - Follow ABGs for vent adjustment as needed    #Right-sided Pneumothorax  - Found to have right-sided pneumothorax on CXR possibly iso intubation versus CPR  - S/P pigtail catheter with reinflation, residual pneumo on CXR. Monitor CXR for resolution. Chest tube to low wall suction for now      Cardiovascular  #VFib arrest  - Reported VFib by EMS  - S/P defib x2, epi x3  with EMS  - Monitor on tele  - On PPM interrogation appears to have had episode of VT around ~1900. Starting lidocaine at rate of 1mg/min  - F/U TTE  - Not requiring pressor support at this time while sedated  - Currently in paced rhythm  - F/U lipid profile  - EP consult  - Troponin of 64, follow to peak    #AFib/Flutter  - Per outpatient cardiologist patient is supposed to be on carvedilol. Had been taken off meds when he was admitted at [place] for PNA iso hypotension and required midodrine  - Hold carvedilol iso low blood pressures, likely vasoplegia while on prop. Continue to monitor for ability to restart  - On eliquis 2.5mg BID at home, will initiate heparin drip for AC    #HFrEF  - Per outpatient cardiologist was on SGLT2i, spironolactone, Entresto lasix, but was taken off while in the hospital due to hypotension  - Most recent medications per cardiologist were midodrine, lasix, alogliptin and metformin, eliquis 2.5mg BID  - Would not restart GDMT at this time given blood pressures. Continue to reassess ability to reinitiate  - Pro- BNP of ~5800  - F/U TTE      #CAD s/p stents  - 14 stents per family, unsure of last stent placement  - Will need to follow up on home medication regimen      Renal  #YESICA on CKD  - Cr of 1.84 initially, per outpatient cardiologist usually is around 1.5-2.0  - Most recently ~2.5  - Possibly ATN iso arrest and low flow state  - UOP ~5-10cc/hr thus far  - Monitor I&Os  - S/P 500cc bolus x 2    #Lactic acidemia, HAGMA  - VBG with pH of 7.20 most recently, lactate of 10.5 on admission, currently downtrending  - Anion gap trending down   - Likely iso cardiac arrest  - Continue to trend for resolution, follow ABG for resolution of acidemia      GI  - Dullness to percussion on RLQ, but is soft to palpation. F/U abdominal xray  - NPO for now iso intubation  - Initiate tube feeds when patient more stable.    #Elevated Liver Enzymes  - Elevated LFTs, likely iso low flow state  - Continue to monitor      Endocrine  #T2DM  - FS q6hr while NPO with ISS for correction q6hr, 3U NPH q6hr  - A1C 7.1  - Prior to arrest had sugar of 135 per wife    ID  - Borderline temperature on esophageal probe  - Elevated WBC, still likely reactive iso VFib arrest  - Continue to monitor WBC count post arrest and monitor for febrile episodes  - Hold off on antibiotics for now, but will send blood cultures, UA    Heme  - No active issues, continue to monitor hemoglobin  - Heparin drip for AC given AFib/Flutter history     87 year old man with history of HFrEF last EF 40-45%, Afib/Aflutter s/p ablation, high grade AV block s/p PPM, T2DM, CKD brought in by EMS following cardiac arrest at home. Found to have VF s/p defib x 2, epi x 3 with ROSC and with course complicated by right-sided pneumothorax s/p chest tube. Patient admitted to CICU for further management post VF arrest.    Neuro  #Sedated  - Sedated on fentanyl in the ED, transitioned to prop  - Wean as tolerated to assess mental status per ICU protocol    #Upper extremity myoclonic jerks  - F/U CTH given initial noted upper extremity intermittent jerking movements. ?anoxia. Per wife she had initiated CPR pretty immediately and called for EMS within 5-10 minutes  - vEEG ordered      Respiratory  #Intubated  - Intubated in the field for airway protection s/p VFib arrest. Tube exchanged while in the ED.  - Current vent settings: 16/400/6/40  - SBTs as tolerated  - Follow ABGs for vent adjustment as needed    #Right-sided Pneumothorax  - Found to have right-sided pneumothorax on CXR possibly iso intubation versus CPR  - S/P pigtail catheter with reinflation, residual pneumo on CXR. Monitor CXR for resolution. Chest tube to low wall suction for now      Cardiovascular  #VFib arrest  - Reported VFib by EMS  - S/P defib x2, epi x3  with EMS  - Monitor on tele  - On PPM interrogation appears to have had episode of VT around ~1900. Starting lidocaine at rate of 1mg/min  - F/U TTE  - Not requiring pressor support at this time while sedated  - Currently in paced rhythm  - F/U lipid profile  - EP consult  - Troponin of 64, follow to peak    #AFib/Flutter  - Per outpatient cardiologist patient is supposed to be on carvedilol. Had been taken off meds when he was admitted at [facility] for PNA iso hypotension and required midodrine  - Hold carvedilol iso low blood pressures, likely vasoplegia while on prop. Continue to monitor for ability to restart  - On eliquis 2.5mg BID at home, will initiate heparin drip for AC    #HFrEF  - Per outpatient cardiologist was on SGLT2i, spironolactone, Entresto lasix, but was taken off while in the hospital due to hypotension  - Most recent medications per cardiologist were midodrine, lasix, alogliptin and metformin, eliquis 2.5mg BID  - Would not restart GDMT at this time given blood pressures. Continue to reassess ability to reinitiate  - Pro- BNP of ~5800  - F/U TTE      #CAD s/p stents  - 14 stents per family, unsure of last stent placement  - Will need to follow up on home medication regimen      Renal  #YESICA on CKD  - Cr of 1.84 initially, per outpatient cardiologist usually is around 1.5-2.0  - Most recently ~2.5  - Possibly ATN iso arrest and low flow state  - UOP ~5-10cc/hr thus far  - Monitor I&Os  - S/P 500cc bolus x 2    #Lactic acidemia, HAGMA  - VBG with pH of 7.20 most recently, lactate of 10.5 on admission, currently downtrending  - Anion gap trending down   - Likely iso cardiac arrest  - Continue to trend for resolution, follow ABG for resolution of acidemia      GI  - Dullness to percussion on RLQ, but is soft to palpation. F/U abdominal xray  - NPO for now iso intubation  - Initiate tube feeds when patient more stable.    #Elevated Liver Enzymes  - Elevated LFTs, likely iso low flow state  - Continue to monitor      Endocrine  #T2DM  - FS q6hr while NPO with ISS for correction q6hr, 3U NPH q6hr  - A1C 7.1  - Prior to arrest had sugar of 135 per wife      ID  - Borderline temperature on esophageal probe  - Elevated WBC, still likely reactive iso VFib arrest  - Continue to monitor WBC count post arrest and monitor for febrile episodes  - Hold off on antibiotics for now, but will send blood cultures, UA      Heme  - No active issues, continue to monitor hemoglobin  - Heparin drip for AC given AFib/Flutter history

## 2023-02-03 NOTE — ED PROVIDER NOTE - OBJECTIVE STATEMENT
Mason Recio, PGY-3- 87-year-old male with past medical history of congestive heart failure, DM, is brought to ED by EMS from home for evaluation of witnessed cardiac arrest.  Per wife, patient was seated in chair without complaints and then suddenly collapsed.  Wife reports that she noticed he was not breathing or having a heartbeat and started to do CPR.  She then called 911 and return to patient.  On EMS arrival patient was in V. fib arrest and status post 2 defibrillations and 3 rounds of epi with ROSC obtained.  Patient was subsequently intubated in the field and arrived to ED with continued ROSC.  Per family, patient was recently admitted to [facility] for CHF exacerbation and diabetes medication management.  Recently had metformin and insulin doses changed.

## 2023-02-03 NOTE — ED ADULT NURSE NOTE - OBJECTIVE STATEMENT
Pt is a 87y male who presents to [facility] ED as a code ACLS, pt arrives to the ED with ROSC, 18G peripheral IV R wrist with Larry machine on pt chest, and intubated in field with 7.5 tube 29 at the lip. As per EMS report, cardiac arrest was witnessed by wife @1905, pt was initially in V-fib was given 2 shocks and 3 amps of Epi before converting to ROSC with paced rhythm, FS performed in field with BGL of 171, pt was recently hospitalized in [place] and discharged last week, unsure reason for admission. PMH of CHF and DM. Pt arrives to the Ed @1945, strong femoral pulses palpable, , pt was reintubated @1952 with 7.5 tube and Pt is a 87y male who presents to [facility] ED as a code ACLS, pt arrives to the ED with ROSC, 18G peripheral IV R wrist with Larry machine on pt chest, and intubated in field with 7.5 tube 29 at the lip. As per EMS report, cardiac arrest was witnessed by wife @1905, pt was initially in V-fib was given 2 shocks and 3 amps of Epi before converting to ROSC with paced rhythm, FS performed in field with BGL of 171, pt was recently hospitalized in [place] and discharged last week, unsure reason for admission. PMH of CHF and DM according to EMS. Pt arrives to the ED @1945, strong femoral pulses palpable, pt was reintubated @1952 with 7.5 tube and 22 at the lip with color change, pt beginning to have spontaneous respirations, no breath sounds noted on R side, chest tube to be placed for pneumothorax confirmed on chest x-ray. @1959 pt started on Fentanyl drip rate @0.5mcg/kg/hr. 18G peripheral IV palced in L wrist. Pt weight 70kg.

## 2023-02-03 NOTE — ED ADULT NURSE REASSESSMENT NOTE - NS ED NURSE REASSESS COMMENT FT1
@2225 MD Persaud at bedside speaking with family prior to transporting pt to CICU, @2230 pt began to have increased movement, as per MD fentanyl drip rate increased to 1.75mcg/kg, @2233 rate increased to 2.0mcg/kg , @2236 rate increased to 2.5mcg/kg, @2238 rate increased to 3.0mcg/kg due to increased movement. As per MD Persaud, pt to be transported straight to CICU and to not get CT scans done until tomorrow morning.

## 2023-02-03 NOTE — ED PROCEDURE NOTE - NS ED PROCEUDURE1 POST INTUBATION REVIEW
ETT is positioned correctly, patient has a R moderate pneumothorax/Appropriate capnography/Positive end tidal Co2 noted/Chest X-Ray

## 2023-02-03 NOTE — ED ADULT NURSE REASSESSMENT NOTE - NS ED NURSE REASSESS COMMENT FT1
Pt tele-packed and waiting for respiratory and transport to bring pt to CICU, report given to NAKITA David. Pt began to move in the stretcher, ED RN increased dosage of fentanyl drip infusion to 1.5mcg/kg as per MD order.

## 2023-02-04 LAB
A1C WITH ESTIMATED AVERAGE GLUCOSE RESULT: 7.1 % — HIGH (ref 4–5.6)
ALBUMIN SERPL ELPH-MCNC: 3.2 G/DL — LOW (ref 3.3–5)
ALBUMIN SERPL ELPH-MCNC: 3.3 G/DL — SIGNIFICANT CHANGE UP (ref 3.3–5)
ALBUMIN SERPL ELPH-MCNC: 3.8 G/DL — SIGNIFICANT CHANGE UP (ref 3.3–5)
ALP SERPL-CCNC: 113 U/L — SIGNIFICANT CHANGE UP (ref 40–120)
ALP SERPL-CCNC: 88 U/L — SIGNIFICANT CHANGE UP (ref 40–120)
ALP SERPL-CCNC: 95 U/L — SIGNIFICANT CHANGE UP (ref 40–120)
ALT FLD-CCNC: 114 U/L — HIGH (ref 10–45)
ALT FLD-CCNC: 135 U/L — HIGH (ref 10–45)
ALT FLD-CCNC: 161 U/L — HIGH (ref 10–45)
ANION GAP SERPL CALC-SCNC: 17 MMOL/L — SIGNIFICANT CHANGE UP (ref 5–17)
ANION GAP SERPL CALC-SCNC: 19 MMOL/L — HIGH (ref 5–17)
ANION GAP SERPL CALC-SCNC: 21 MMOL/L — HIGH (ref 5–17)
APPEARANCE UR: CLEAR — SIGNIFICANT CHANGE UP
APTT BLD: 29.9 SEC — SIGNIFICANT CHANGE UP (ref 27.5–35.5)
APTT BLD: 49.5 SEC — HIGH (ref 27.5–35.5)
APTT BLD: 61.3 SEC — HIGH (ref 27.5–35.5)
AST SERPL-CCNC: 117 U/L — HIGH (ref 10–40)
AST SERPL-CCNC: 169 U/L — HIGH (ref 10–40)
AST SERPL-CCNC: 278 U/L — HIGH (ref 10–40)
BACTERIA # UR AUTO: NEGATIVE — SIGNIFICANT CHANGE UP
BILIRUB SERPL-MCNC: 0.5 MG/DL — SIGNIFICANT CHANGE UP (ref 0.2–1.2)
BILIRUB SERPL-MCNC: 0.5 MG/DL — SIGNIFICANT CHANGE UP (ref 0.2–1.2)
BILIRUB SERPL-MCNC: 0.7 MG/DL — SIGNIFICANT CHANGE UP (ref 0.2–1.2)
BILIRUB UR-MCNC: NEGATIVE — SIGNIFICANT CHANGE UP
BLD GP AB SCN SERPL QL: NEGATIVE — SIGNIFICANT CHANGE UP
BUN SERPL-MCNC: 51 MG/DL — HIGH (ref 7–23)
BUN SERPL-MCNC: 51 MG/DL — HIGH (ref 7–23)
BUN SERPL-MCNC: 57 MG/DL — HIGH (ref 7–23)
CALCIUM SERPL-MCNC: 9 MG/DL — SIGNIFICANT CHANGE UP (ref 8.4–10.5)
CALCIUM SERPL-MCNC: 9 MG/DL — SIGNIFICANT CHANGE UP (ref 8.4–10.5)
CALCIUM SERPL-MCNC: 9.3 MG/DL — SIGNIFICANT CHANGE UP (ref 8.4–10.5)
CHLORIDE SERPL-SCNC: 92 MMOL/L — LOW (ref 96–108)
CHLORIDE SERPL-SCNC: 93 MMOL/L — LOW (ref 96–108)
CHLORIDE SERPL-SCNC: 94 MMOL/L — LOW (ref 96–108)
CHOLEST SERPL-MCNC: 116 MG/DL — SIGNIFICANT CHANGE UP
CO2 SERPL-SCNC: 20 MMOL/L — LOW (ref 22–31)
CO2 SERPL-SCNC: 20 MMOL/L — LOW (ref 22–31)
CO2 SERPL-SCNC: 21 MMOL/L — LOW (ref 22–31)
COLOR SPEC: SIGNIFICANT CHANGE UP
CREAT SERPL-MCNC: 2.52 MG/DL — HIGH (ref 0.5–1.3)
CREAT SERPL-MCNC: 2.86 MG/DL — HIGH (ref 0.5–1.3)
CREAT SERPL-MCNC: 3.62 MG/DL — HIGH (ref 0.5–1.3)
DIFF PNL FLD: ABNORMAL
EGFR: 16 ML/MIN/1.73M2 — LOW
EGFR: 21 ML/MIN/1.73M2 — LOW
EGFR: 24 ML/MIN/1.73M2 — LOW
EPI CELLS # UR: 3 /HPF — SIGNIFICANT CHANGE UP
ESTIMATED AVERAGE GLUCOSE: 157 MG/DL — HIGH (ref 68–114)
GAS PNL BLDA: SIGNIFICANT CHANGE UP
GAS PNL BLDA: SIGNIFICANT CHANGE UP
GLUCOSE BLDC GLUCOMTR-MCNC: 204 MG/DL — HIGH (ref 70–99)
GLUCOSE BLDC GLUCOMTR-MCNC: 228 MG/DL — HIGH (ref 70–99)
GLUCOSE BLDC GLUCOMTR-MCNC: 273 MG/DL — HIGH (ref 70–99)
GLUCOSE BLDC GLUCOMTR-MCNC: 309 MG/DL — HIGH (ref 70–99)
GLUCOSE SERPL-MCNC: 258 MG/DL — HIGH (ref 70–99)
GLUCOSE SERPL-MCNC: 310 MG/DL — HIGH (ref 70–99)
GLUCOSE SERPL-MCNC: 396 MG/DL — HIGH (ref 70–99)
GLUCOSE UR QL: NEGATIVE — SIGNIFICANT CHANGE UP
HCT VFR BLD CALC: 37 % — LOW (ref 39–50)
HCT VFR BLD CALC: 37.5 % — LOW (ref 39–50)
HCT VFR BLD CALC: 39.2 % — SIGNIFICANT CHANGE UP (ref 39–50)
HDLC SERPL-MCNC: 46 MG/DL — SIGNIFICANT CHANGE UP
HGB BLD-MCNC: 11.9 G/DL — LOW (ref 13–17)
HGB BLD-MCNC: 11.9 G/DL — LOW (ref 13–17)
HGB BLD-MCNC: 12.5 G/DL — LOW (ref 13–17)
HYALINE CASTS # UR AUTO: 1 /LPF — SIGNIFICANT CHANGE UP (ref 0–2)
INR BLD: 1.57 RATIO — HIGH (ref 0.88–1.16)
INR BLD: 1.59 RATIO — HIGH (ref 0.88–1.16)
KETONES UR-MCNC: NEGATIVE — SIGNIFICANT CHANGE UP
LEUKOCYTE ESTERASE UR-ACNC: NEGATIVE — SIGNIFICANT CHANGE UP
LIPID PNL WITH DIRECT LDL SERPL: 35 MG/DL — SIGNIFICANT CHANGE UP
MAGNESIUM SERPL-MCNC: 2.1 MG/DL — SIGNIFICANT CHANGE UP (ref 1.6–2.6)
MCHC RBC-ENTMCNC: 29.8 PG — SIGNIFICANT CHANGE UP (ref 27–34)
MCHC RBC-ENTMCNC: 29.9 PG — SIGNIFICANT CHANGE UP (ref 27–34)
MCHC RBC-ENTMCNC: 29.9 PG — SIGNIFICANT CHANGE UP (ref 27–34)
MCHC RBC-ENTMCNC: 31.7 GM/DL — LOW (ref 32–36)
MCHC RBC-ENTMCNC: 31.9 GM/DL — LOW (ref 32–36)
MCHC RBC-ENTMCNC: 32.2 GM/DL — SIGNIFICANT CHANGE UP (ref 32–36)
MCV RBC AUTO: 93 FL — SIGNIFICANT CHANGE UP (ref 80–100)
MCV RBC AUTO: 93.8 FL — SIGNIFICANT CHANGE UP (ref 80–100)
MCV RBC AUTO: 93.8 FL — SIGNIFICANT CHANGE UP (ref 80–100)
NITRITE UR-MCNC: NEGATIVE — SIGNIFICANT CHANGE UP
NON HDL CHOLESTEROL: 70 MG/DL — SIGNIFICANT CHANGE UP
NRBC # BLD: 0 /100 WBCS — SIGNIFICANT CHANGE UP (ref 0–0)
PH UR: 6 — SIGNIFICANT CHANGE UP (ref 5–8)
PHOSPHATE SERPL-MCNC: 4.8 MG/DL — HIGH (ref 2.5–4.5)
PHOSPHATE SERPL-MCNC: 5.8 MG/DL — HIGH (ref 2.5–4.5)
PHOSPHATE SERPL-MCNC: 5.9 MG/DL — HIGH (ref 2.5–4.5)
PLATELET # BLD AUTO: 284 K/UL — SIGNIFICANT CHANGE UP (ref 150–400)
PLATELET # BLD AUTO: 311 K/UL — SIGNIFICANT CHANGE UP (ref 150–400)
PLATELET # BLD AUTO: 338 K/UL — SIGNIFICANT CHANGE UP (ref 150–400)
POTASSIUM SERPL-MCNC: 3.4 MMOL/L — LOW (ref 3.5–5.3)
POTASSIUM SERPL-MCNC: 4.2 MMOL/L — SIGNIFICANT CHANGE UP (ref 3.5–5.3)
POTASSIUM SERPL-MCNC: 4.5 MMOL/L — SIGNIFICANT CHANGE UP (ref 3.5–5.3)
POTASSIUM SERPL-SCNC: 3.4 MMOL/L — LOW (ref 3.5–5.3)
POTASSIUM SERPL-SCNC: 4.2 MMOL/L — SIGNIFICANT CHANGE UP (ref 3.5–5.3)
POTASSIUM SERPL-SCNC: 4.5 MMOL/L — SIGNIFICANT CHANGE UP (ref 3.5–5.3)
PROT SERPL-MCNC: 6.6 G/DL — SIGNIFICANT CHANGE UP (ref 6–8.3)
PROT SERPL-MCNC: 6.6 G/DL — SIGNIFICANT CHANGE UP (ref 6–8.3)
PROT SERPL-MCNC: 7.1 G/DL — SIGNIFICANT CHANGE UP (ref 6–8.3)
PROT UR-MCNC: ABNORMAL
PROTHROM AB SERPL-ACNC: 18.3 SEC — HIGH (ref 10.5–13.4)
PROTHROM AB SERPL-ACNC: 18.5 SEC — HIGH (ref 10.5–13.4)
RBC # BLD: 3.98 M/UL — LOW (ref 4.2–5.8)
RBC # BLD: 4 M/UL — LOW (ref 4.2–5.8)
RBC # BLD: 4.18 M/UL — LOW (ref 4.2–5.8)
RBC # FLD: 16.4 % — HIGH (ref 10.3–14.5)
RBC # FLD: 16.4 % — HIGH (ref 10.3–14.5)
RBC # FLD: 16.6 % — HIGH (ref 10.3–14.5)
RBC CASTS # UR COMP ASSIST: 21 /HPF — HIGH (ref 0–4)
RH IG SCN BLD-IMP: POSITIVE — SIGNIFICANT CHANGE UP
RH IG SCN BLD-IMP: POSITIVE — SIGNIFICANT CHANGE UP
SODIUM SERPL-SCNC: 132 MMOL/L — LOW (ref 135–145)
SODIUM SERPL-SCNC: 132 MMOL/L — LOW (ref 135–145)
SODIUM SERPL-SCNC: 133 MMOL/L — LOW (ref 135–145)
SP GR SPEC: 1.01 — SIGNIFICANT CHANGE UP (ref 1.01–1.02)
TRIGL SERPL-MCNC: 174 MG/DL — HIGH
TSH SERPL-MCNC: 4.62 UIU/ML — HIGH (ref 0.27–4.2)
UROBILINOGEN FLD QL: NEGATIVE — SIGNIFICANT CHANGE UP
WBC # BLD: 17.15 K/UL — HIGH (ref 3.8–10.5)
WBC # BLD: 18.03 K/UL — HIGH (ref 3.8–10.5)
WBC # BLD: 20.1 K/UL — HIGH (ref 3.8–10.5)
WBC # FLD AUTO: 17.15 K/UL — HIGH (ref 3.8–10.5)
WBC # FLD AUTO: 18.03 K/UL — HIGH (ref 3.8–10.5)
WBC # FLD AUTO: 20.1 K/UL — HIGH (ref 3.8–10.5)
WBC UR QL: 3 /HPF — SIGNIFICANT CHANGE UP (ref 0–5)

## 2023-02-04 PROCEDURE — 74018 RADEX ABDOMEN 1 VIEW: CPT | Mod: 26

## 2023-02-04 PROCEDURE — 71045 X-RAY EXAM CHEST 1 VIEW: CPT | Mod: 26

## 2023-02-04 PROCEDURE — 99292 CRITICAL CARE ADDL 30 MIN: CPT

## 2023-02-04 PROCEDURE — 93280 PM DEVICE PROGR EVAL DUAL: CPT | Mod: 26

## 2023-02-04 PROCEDURE — 99233 SBSQ HOSP IP/OBS HIGH 50: CPT

## 2023-02-04 PROCEDURE — 99291 CRITICAL CARE FIRST HOUR: CPT

## 2023-02-04 PROCEDURE — 99223 1ST HOSP IP/OBS HIGH 75: CPT

## 2023-02-04 PROCEDURE — 93306 TTE W/DOPPLER COMPLETE: CPT | Mod: 26

## 2023-02-04 PROCEDURE — 71045 X-RAY EXAM CHEST 1 VIEW: CPT | Mod: 26,77,76

## 2023-02-04 PROCEDURE — 70450 CT HEAD/BRAIN W/O DYE: CPT | Mod: 26

## 2023-02-04 RX ORDER — GLUCAGON INJECTION, SOLUTION 0.5 MG/.1ML
1 INJECTION, SOLUTION SUBCUTANEOUS ONCE
Refills: 0 | Status: DISCONTINUED | OUTPATIENT
Start: 2023-02-04 | End: 2023-02-05

## 2023-02-04 RX ORDER — SODIUM CHLORIDE 9 MG/ML
500 INJECTION, SOLUTION INTRAVENOUS ONCE
Refills: 0 | Status: COMPLETED | OUTPATIENT
Start: 2023-02-04 | End: 2023-02-04

## 2023-02-04 RX ORDER — INSULIN LISPRO 100/ML
VIAL (ML) SUBCUTANEOUS
Refills: 0 | Status: DISCONTINUED | OUTPATIENT
Start: 2023-02-04 | End: 2023-02-04

## 2023-02-04 RX ORDER — HUMAN INSULIN 100 [IU]/ML
3 INJECTION, SUSPENSION SUBCUTANEOUS EVERY 6 HOURS
Refills: 0 | Status: DISCONTINUED | OUTPATIENT
Start: 2023-02-04 | End: 2023-02-05

## 2023-02-04 RX ORDER — HEPARIN SODIUM 5000 [USP'U]/ML
800 INJECTION INTRAVENOUS; SUBCUTANEOUS
Qty: 25000 | Refills: 0 | Status: DISCONTINUED | OUTPATIENT
Start: 2023-02-04 | End: 2023-02-05

## 2023-02-04 RX ORDER — BUMETANIDE 0.25 MG/ML
4 INJECTION INTRAMUSCULAR; INTRAVENOUS ONCE
Refills: 0 | Status: COMPLETED | OUTPATIENT
Start: 2023-02-04 | End: 2023-02-04

## 2023-02-04 RX ORDER — DEXTROSE 50 % IN WATER 50 %
25 SYRINGE (ML) INTRAVENOUS ONCE
Refills: 0 | Status: DISCONTINUED | OUTPATIENT
Start: 2023-02-04 | End: 2023-02-05

## 2023-02-04 RX ORDER — HEPARIN SODIUM 5000 [USP'U]/ML
800 INJECTION INTRAVENOUS; SUBCUTANEOUS
Qty: 25000 | Refills: 0 | Status: DISCONTINUED | OUTPATIENT
Start: 2023-02-04 | End: 2023-02-04

## 2023-02-04 RX ORDER — POTASSIUM CHLORIDE 20 MEQ
40 PACKET (EA) ORAL ONCE
Refills: 0 | Status: COMPLETED | OUTPATIENT
Start: 2023-02-04 | End: 2023-02-04

## 2023-02-04 RX ORDER — DEXTROSE 50 % IN WATER 50 %
12.5 SYRINGE (ML) INTRAVENOUS ONCE
Refills: 0 | Status: DISCONTINUED | OUTPATIENT
Start: 2023-02-04 | End: 2023-02-05

## 2023-02-04 RX ORDER — SODIUM CHLORIDE 9 MG/ML
1000 INJECTION, SOLUTION INTRAVENOUS
Refills: 0 | Status: DISCONTINUED | OUTPATIENT
Start: 2023-02-04 | End: 2023-02-05

## 2023-02-04 RX ORDER — INSULIN LISPRO 100/ML
VIAL (ML) SUBCUTANEOUS AT BEDTIME
Refills: 0 | Status: DISCONTINUED | OUTPATIENT
Start: 2023-02-04 | End: 2023-02-04

## 2023-02-04 RX ORDER — INSULIN LISPRO 100/ML
VIAL (ML) SUBCUTANEOUS EVERY 6 HOURS
Refills: 0 | Status: DISCONTINUED | OUTPATIENT
Start: 2023-02-04 | End: 2023-02-05

## 2023-02-04 RX ORDER — INSULIN LISPRO 100/ML
VIAL (ML) SUBCUTANEOUS EVERY 6 HOURS
Refills: 0 | Status: DISCONTINUED | OUTPATIENT
Start: 2023-02-04 | End: 2023-02-04

## 2023-02-04 RX ORDER — INSULIN LISPRO 100/ML
3 VIAL (ML) SUBCUTANEOUS ONCE
Refills: 0 | Status: COMPLETED | OUTPATIENT
Start: 2023-02-04 | End: 2023-02-04

## 2023-02-04 RX ORDER — DEXTROSE 50 % IN WATER 50 %
15 SYRINGE (ML) INTRAVENOUS ONCE
Refills: 0 | Status: DISCONTINUED | OUTPATIENT
Start: 2023-02-04 | End: 2023-02-05

## 2023-02-04 RX ADMIN — PROPOFOL 9.89 MICROGRAM(S)/KG/MIN: 10 INJECTION, EMULSION INTRAVENOUS at 15:40

## 2023-02-04 RX ADMIN — HUMAN INSULIN 3 UNIT(S): 100 INJECTION, SUSPENSION SUBCUTANEOUS at 17:50

## 2023-02-04 RX ADMIN — HEPARIN SODIUM 10 UNIT(S)/HR: 5000 INJECTION INTRAVENOUS; SUBCUTANEOUS at 19:30

## 2023-02-04 RX ADMIN — HUMAN INSULIN 3 UNIT(S): 100 INJECTION, SUSPENSION SUBCUTANEOUS at 12:14

## 2023-02-04 RX ADMIN — Medication 7.5 MG/MIN: at 12:16

## 2023-02-04 RX ADMIN — PROPOFOL 9.89 MICROGRAM(S)/KG/MIN: 10 INJECTION, EMULSION INTRAVENOUS at 12:16

## 2023-02-04 RX ADMIN — PROPOFOL 9.89 MICROGRAM(S)/KG/MIN: 10 INJECTION, EMULSION INTRAVENOUS at 19:30

## 2023-02-04 RX ADMIN — Medication 3 UNIT(S): at 02:48

## 2023-02-04 RX ADMIN — Medication 3: at 06:18

## 2023-02-04 RX ADMIN — Medication 1 DROP(S): at 17:50

## 2023-02-04 RX ADMIN — Medication 40 MILLIEQUIVALENT(S): at 06:36

## 2023-02-04 RX ADMIN — Medication 4: at 17:51

## 2023-02-04 RX ADMIN — Medication 3: at 12:15

## 2023-02-04 RX ADMIN — CHLORHEXIDINE GLUCONATE 15 MILLILITER(S): 213 SOLUTION TOPICAL at 06:36

## 2023-02-04 RX ADMIN — CHLORHEXIDINE GLUCONATE 1 APPLICATION(S): 213 SOLUTION TOPICAL at 23:00

## 2023-02-04 RX ADMIN — Medication 7.5 MG/MIN: at 19:30

## 2023-02-04 RX ADMIN — SODIUM CHLORIDE 500 MILLILITER(S): 9 INJECTION, SOLUTION INTRAVENOUS at 02:48

## 2023-02-04 RX ADMIN — BUMETANIDE 132 MILLIGRAM(S): 0.25 INJECTION INTRAMUSCULAR; INTRAVENOUS at 18:18

## 2023-02-04 RX ADMIN — HEPARIN SODIUM 10 UNIT(S)/HR: 5000 INJECTION INTRAVENOUS; SUBCUTANEOUS at 13:37

## 2023-02-04 RX ADMIN — SODIUM CHLORIDE 500 MILLILITER(S): 9 INJECTION, SOLUTION INTRAVENOUS at 07:25

## 2023-02-04 RX ADMIN — CHLORHEXIDINE GLUCONATE 15 MILLILITER(S): 213 SOLUTION TOPICAL at 17:49

## 2023-02-04 NOTE — CONSULT NOTE ADULT - TIME BILLING
Direct patient Interaction and evaluation, chart review, image review, medical decision making and care coordination

## 2023-02-04 NOTE — PROGRESS NOTE ADULT - SUBJECTIVE AND OBJECTIVE BOX
CICU DAY NOTE  Admission date: 2/3/23  Chief complaint/ Diagnosis: VF arrest   HPI: 87 year old man with history of HFrEF last EF 40-45%, Afib/Aflutter s/p ablation, high grade AV block s/p PPM, T2DM, CKD brought in by EMS following cardiac arrest at home. Was seated in chair when he suddenly collapsed as witnessed by his wife and was noted to not be breathing or have a pulse. Per wife he slumped in chair, did not fall and hit his head. Wife started CPR, EMS was contacted and upon arrival was assessed to be in VF. Was shocked twice, given three rounds of epi and had ROSC. Intubated in the field and arrived at the ED with pulse. Per family patient also recently admitted to [facility] for HF exacerbation, found to have PNA for which he was treated. Per wife, patient had checked his blood sugar prior to arrest and was 135. Patient's wife believes she called for EMS maybe 5-10 minutes after he went unresponsive.    In the ED, noted to have /73, HR 81, 100% saturation. Elevated WBC to 15.3, HAGMA with lactate of 10.5 on VBG, Pro-BNP of 5819. He had ETT changed and was then noted to not have lung sounds on the right side. Was found to have moderate sized pneumo on CXR, now s/p right-sided chest tube with reinflation of lung seen on CXR. Patient was sedated on fent and admitted to CICU for further management s/p VF arrest.    Interval history: Lido, Prop@20 Hep gtt     REVIEW OF SYSTEMS  Denies CP, Palpitation, SOB, Dyspnea [x  ] All other systems negative    MEDICATIONS  (STANDING)  chlorhexidine 0.12% Liquid 15 milliLiter(s) Oral Mucosa every 12 hours  glucagon  Injectable 1 milliGRAM(s) IntraMuscular once  heparin  Infusion 800 Unit(s)/Hr (10 mL/Hr) IV Continuous <Continuous>  insulin lispro (ADMELOG) corrective regimen sliding scale   SubCutaneous every 6 hours  insulin NPH human recombinant 3 Unit(s) SubCutaneous every 6 hours  lidocaine   Infusion 1 mG/Min (7.5 mL/Hr) IV Continuous <Continuous>  propofol Infusion 25 MICROgram(s)/kG/Min (9.89 mL/Hr) IV Continuous <Continuous>    MEDICATIONS  (PRN):  dextrose Oral Gel 15 Gram(s) Oral once PRN Blood Glucose LESS THAN 70 milliGRAM(s)/deciliter    PAST MEDICAL & SURGICAL HISTORY:  Congestive heart failure (CHF)    Allergy   No Known Allergies    ICU Vital Signs Last 24 Hrs  T(C): 37.9 (Max: 37.9)  HR: 102 (68 - 120)  BP: 122/78 (116/73 - 143/99)  BP(mean): 95 (87 - 114)  ABP: 102/60  (88/47 - 127/73)  ABP(mean): 72 (58 - 93)  RR: 27(15 - 39)  SpO2: 100%  (95% - 100%) on vent 40%     Mode: AC/ CMV (Assist Control/ Continuous Mandatory Ventilation)  RR (machine): 16TV (machine): [id number]: 40 PEEP: 6    I&O's Summary  IN: 1723.9 mL / OUT: 85 mL / NET: 1638.9 mL     PHYSICAL EXAM  Appearance: Normal, NAD  HEAD:  Normocephalic  EYES:  PERRLA, conjunctiva and sclera clear  NECK: Supple, No JVD  CHEST/LUNG: Clear to auscultation bilaterally; No wheezing  HEART: Normal S1, S2. No murmurs, rubs, or gallops  ABDOMEN: + Bowel sounds, Soft, NT, ND   EXTREMITIES:  2+ Peripheral Pulses, No clubbing, cyanosis, or edema  NEUROLOGY: non-foca  SKIN: No rashes or lesions    Interpretation of Telemetry:                                  11.9   18.03<20.10 )-----------( 311                           37.0     132<133  |  93<L>  |  51<H>  ----------------------------<  310<H>  4.2   |  20<L>  |  2.86<2.52    Ca    9.0   Phos  4.8     Mg     2.1      TPro  6.6  /  Alb  3.3  /  TBili  0.5  /  DBili  x   /  AST  169<H>  /  ALT  135<H>  /  AlkPhos  95      ABG - ( 04 Feb 2023 05:20 )  pH, Arterial: 7.44  pH/  pCO2: 36   /  pO2: 144   / HCO3: 24    / Base Excess: 0.6   /  SaO2: 99.6                 CICU DAY NOTE  Admission date: 2/3/23  Chief complaint/ Diagnosis: VF arrest   HPI: 87 year old man with history of HFrEF last EF 40-45%, Afib/Aflutter s/p ablation, high grade AV block s/p PPM, T2DM, CKD brought in by EMS following cardiac arrest at home. Was seated in chair when he suddenly collapsed as witnessed by his wife and was noted to not be breathing or have a pulse. Per wife he slumped in chair, did not fall and hit his head. Wife started CPR, EMS was contacted and upon arrival was assessed to be in VF. Was shocked twice, given three rounds of epi and had ROSC. Intubated in the field and arrived at the ED with pulse. Per family patient also recently admitted to [facility] for HF exacerbation, found to have PNA for which he was treated. Per wife, patient had checked his blood sugar prior to arrest and was 135. Patient's wife believes she called for EMS maybe 5-10 minutes after he went unresponsive.    In the ED, noted to have /73, HR 81, 100% saturation. Elevated WBC to 15.3, HAGMA with lactate of 10.5 on VBG, Pro-BNP of 5819. He had ETT changed and was then noted to not have lung sounds on the right side. Was found to have moderate sized pneumo on CXR, now s/p right-sided chest tube with reinflation of lung seen on CXR. Patient was sedated on fent and admitted to CICU for further management s/p VF arrest.    Interval history: Lido@1, Prop@20 Hep gtt   No further VT  Jerk like movement noted overnight and Prop gtt started     REVIEW OF SYSTEMS  Denies CP, Palpitation, SOB, Dyspnea [x  ] All other systems negative    MEDICATIONS  (STANDING)  chlorhexidine 0.12% Liquid 15 milliLiter(s) Oral Mucosa every 12 hours  glucagon  Injectable 1 milliGRAM(s) IntraMuscular once  heparin  Infusion 800 Unit(s)/Hr (10 mL/Hr) IV Continuous <Continuous>  insulin lispro (ADMELOG) corrective regimen sliding scale   SubCutaneous every 6 hours  insulin NPH human recombinant 3 Unit(s) SubCutaneous every 6 hours  lidocaine   Infusion 1 mG/Min (7.5 mL/Hr) IV Continuous <Continuous>  propofol Infusion 25 MICROgram(s)/kG/Min (9.89 mL/Hr) IV Continuous <Continuous>    MEDICATIONS  (PRN):  dextrose Oral Gel 15 Gram(s) Oral once PRN Blood Glucose LESS THAN 70 milliGRAM(s)/deciliter    PAST MEDICAL & SURGICAL HISTORY:  Congestive heart failure (CHF)    Allergy   No Known Allergies    ICU Vital Signs Last 24 Hrs  T(C): 37.9 (Max: 37.9)  HR: 102 (68 - 120)  BP: 122/78 (116/73 - 143/99)  BP(mean): 95 (87 - 114)  ABP: 102/60  (88/47 - 127/73)  ABP(mean): 72 (58 - 93)  RR: 27(15 - 39)  SpO2: 100%  (95% - 100%) on vent 40%     Mode: AC/ CMV (Assist Control/ Continuous Mandatory Ventilation)  RR (machine): 16TV (machine): [id number]: 40 PEEP: 6    I&O's Summary  IN: 1723.9 mL / OUT: 85 mL / NET: 1638.9 mL     PHYSICAL EXAM  Appearance: Normal, NAD  HEAD:  Normocephalic  EYES:  PERRLA, conjunctiva and sclera clear  NECK: Supple, No JVD  CHEST/LUNG: Clear to auscultation bilaterally; No wheezing  HEART: Normal S1, S2. No murmurs, rubs, or gallops  ABDOMEN: + Bowel sounds, Soft, NT, ND   EXTREMITIES:  2+ Peripheral Pulses, No clubbing, cyanosis, or edema  NEUROLOGY: non-foca  SKIN: No rashes or lesions    Interpretation of Telemetry:                                  11.9   18.03<20.10 )-----------( 311                           37.0     132<133  |  93<L>  |  51<H>  ----------------------------<  310<H>  4.2   |  20<L>  |  2.86<2.52    Ca    9.0   Phos  4.8     Mg     2.1      TPro  6.6  /  Alb  3.3  /  TBili  0.5  /  DBili  x   /  AST  169<H>  /  ALT  135<H>  /  AlkPhos  95      ABG - ( 04 Feb 2023 05:20 )  pH, Arterial: 7.44  pH/  pCO2: 36   /  pO2: 144   / HCO3: 24    / Base Excess: 0.6   /  SaO2: 99.6

## 2023-02-04 NOTE — CONSULT NOTE ADULT - ASSESSMENT
Assessment: 87 year old man presents with V fib arrest, ETT c/b moderate right side ptx, chest tube placement by ED which has migrated to be extrapleural, with residual ptx on right side.     Recs:  - Thoracic surgery will replace pigtail thoracostomy tube on right side   - Hold heparin gtt x4 hours, check coags after held   - Appreciate excellent care per CICU  - Case Discussed with Attending Thoracic Surgeon Dr. EDMAR Heard MD, PGY2  Thoracic Surgery   spectra [phone]

## 2023-02-04 NOTE — CONSULT NOTE ADULT - SUBJECTIVE AND OBJECTIVE BOX
THORACIC SURGERY CONSULT NOTE  --------------------------------------------------------------------------------------------    Patient is a 87y old  Male who presents with a chief complaint of VFib    HPI:   87 year old man with history of HFrEF last EF 40-45%, Afib/Aflutter s/p ablation, high grade AV block s/p PPM, T2DM, CKD brought in by EMS following cardiac arrest at home. Was seated in chair when he suddenly collapsed as witnessed by his wife and was noted to not be breathing or have a pulse. Per wife he slumped in chair, did not fall and hit his head. Wife started CPR, EMS was contacted and upon arrival was assessed to be in VF. Was shocked twice, given three rounds of epi and had ROSC. Intubated in the field and arrived at the ED with pulse. Per family patient also recently admitted to [facility] for HF exacerbation, found to have PNA for which he was treated. Per wife, patient had checked his blood sugar prior to arrest and was 135. Patient's wife believes she called for EMS maybe 5-10 minutes after he went unresponsive.    In the ED, noted to have /73, HR 81, 100% saturation. Elevated WBC to 15.3, HAGMA with lactate of 10.5 on VBG, Pro-BNP of 5819. He had ETT changed and was then noted to not have lung sounds on the right side. Was found to have moderate sized pneumo on CXR, now s/p right-sided pigtail thoracostomy by ED. Sent to CICU. Chest tube noted to be dislodged, thoracic surgery consulted.     Patient examined in CICU, sedated, mech vent 16/400/6/30, no pressor requirement.  On hep gtt.      ROS: 10-system review is otherwise negative except HPI above.      PAST MEDICAL & SURGICAL HISTORY:  Congestive heart failure (CHF)    FAMILY HISTORY:      SOCIAL HISTORY:      ALLERGIES: No Known Allergies      HOME MEDICATIONS:     CURRENT MEDICATIONS  MEDICATIONS (STANDING): dextrose 5%. 1000 milliLiter(s) IV Continuous <Continuous>  dextrose 5%. 1000 milliLiter(s) IV Continuous <Continuous>  dextrose 50% Injectable 25 Gram(s) IV Push once  dextrose 50% Injectable 12.5 Gram(s) IV Push once  dextrose 50% Injectable 25 Gram(s) IV Push once  glucagon  Injectable 1 milliGRAM(s) IntraMuscular once  heparin  Infusion 800 Unit(s)/Hr IV Continuous <Continuous>  insulin lispro (ADMELOG) corrective regimen sliding scale   SubCutaneous every 6 hours  lactated ringers Bolus 500 milliLiter(s) IV Bolus once  lidocaine   Infusion 1 mG/Min IV Continuous <Continuous>  potassium chloride   Solution 40 milliEquivalent(s) Oral once  propofol Infusion 25 MICROgram(s)/kG/Min IV Continuous <Continuous>    MEDICATIONS (PRN):dextrose Oral Gel 15 Gram(s) Oral once PRN Blood Glucose LESS THAN 70 milliGRAM(s)/deciliter    --------------------------------------------------------------------------------------------    Vitals:   T(C): 37.9 (02-04-23 @ 01:30), Max: 37.9 (02-04-23 @ 01:30)  HR: 118 (02-04-23 @ 03:30) (68 - 120)  BP: 122/78 (02-03-23 @ 23:15) (116/73 - 143/99)  RR: 37 (02-04-23 @ 03:30) (15 - 39)  SpO2: 99% (02-04-23 @ 03:30) (95% - 100%)  CAPILLARY BLOOD GLUCOSE    POCT Blood Glucose.: 309 mg/dL (04 Feb 2023 02:45)      02-03 @ 07:01  -  02-04 @ 05:29  --------------------------------------------------------  IN:    Heparin: 8 mL    Heparin: 16 mL    IV PiggyBack: 500 mL    Lidocaine: 75 mL    Propofol: 71.1 mL  Total IN: 670.1 mL    OUT:    Chest Tube (mL): 10 mL    Voided (mL): 75 mL  Total OUT: 85 mL    Total NET: 585.1 mL    Weight (kg): 65.9 (02-03 @ 23:00)    PHYSICAL EXAM:   General: critically ill   Neuro: sedated on fentanyl   HEENT: NC/AT  Cardio: no pressors, pacing pads, AICD left chest   Resp: ETT, mech vent, right pigtail thoracostomy extrapleural, diminished right lung sounds  GI/Abd: Soft, NT/ND, no rebound/guarding, no masses palpated  --------------------------------------------------------------------------------------------    LABS  CBC (02-04 @ 00:25)                              12.5<L>                         20.10<H>  )----------------(  338        --    % Neutrophils, --    % Lymphocytes, ANC: --                                  39.2    CBC (02-03 @ 20:19)                              12.4<L>                         15.30<H>  )----------------(  279        64.8  % Neutrophils, 21.6  % Lymphocytes, ANC: 9.92<H>                              40.9      BMP (02-04 @ 00:24)             133<L>  |  92<L>   |  51<H> 		Ca++ --      Ca 9.3                ---------------------------------( 396<H>		Mg 2.1                3.4<L>  |  20<L>   |  2.52<H>			Ph 5.9<H>  BMP (02-03 @ 20:19)             135     |  96      |  39<H> 		Ca++ --      Ca 9.3                ---------------------------------( 247<H>		Mg 2.5                3.4<L>  |  16<L>   |  1.84<H>			Ph --        LFTs (02-04 @ 00:24)      TPro 7.1 / Alb 3.8 / TBili 0.7 / DBili -- / <H> / <H> / AlkPhos 113  LFTs (02-03 @ 20:19)      TPro 6.6 / Alb 3.4 / TBili 0.7 / DBili -- / AST 99<H> / ALT 49<H> / AlkPhos 72    Coags (02-04 @ 00:57)  aPTT 29.9 / INR 1.57<H> / PT 18.3<H>  Coags (02-03 @ 20:19)  aPTT 30.5 / INR 1.66<H> / PT 19.3<H>      ABG (02-04 @ 05:20)     7.44 / 36 / 144<H> / 24 / 0.6 / 99.6<H>%     Lactate:    ABG (02-03 @ 23:55)     7.35 / 38 / 166<H> / 21 / -4.2<L> / 98.7<H>%     Lactate:      VBG (02-03 @ 21:12)     7.20<L> / 57<H> / 47<H> / 22 / -6.4<L> / 67.0%     Lactate: 7.6<HH>  VBG (02-03 @ 19:51)     7.06<LL> / 69<H> / 46<H> / 20<L> / -11.5<L> / 60.2<L>%     Lactate: 10.5<HH>    --------------------------------------------------------------------------------------------    MICROBIOLOGY      --------------------------------------------------------------------------------------------

## 2023-02-04 NOTE — PROGRESS NOTE ADULT - SUBJECTIVE AND OBJECTIVE BOX
SUNITA LINDSAY  MRN-[id number]  Patient is a 87y old  Male who presents with a chief complaint of VFib (2023 16:32)    HPI:  87 year old man with history of HFrEF last EF 40-45%, Afib/Aflutter s/p ablation, high grade AV block s/p PPM, T2DM, CKD brought in by EMS following cardiac arrest at home. Was seated in chair when he suddenly collapsed as witnessed by his wife and was noted to not be breathing or have a pulse. Per wife he slumped in chair, did not fall and hit his head. Wife started CPR, EMS was contacted and upon arrival was assessed to be in VF. Was shocked twice, given three rounds of epi and had ROSC. Intubated in the field and arrived at the ED with pulse. Per family patient also recently admitted to [facility] for HF exacerbation, found to have PNA for which he was treated. Per wife, patient had checked his blood sugar prior to arrest and was 135. Patient's wife believes she called for EMS maybe 5-10 minutes after he went unresponsive.    In the ED, noted to have /73, HR 81, 100% saturation. Elevated WBC to 15.3, HAGMA with lactate of 10.5 on VBG, Pro-BNP of 5819. He had ETT changed and was then noted to not have lung sounds on the right side. Was found to have moderate sized pneumo on CXR, now s/p right-sided chest tube with reinflation of lung seen on CXR. Patient was sedated on fent and admitted to CICU for further management s/p VF arrest.   (2023 21:36)      Hospital Course:  2/3 Transferred to CCU for further management     24 HOUR EVENTS:    REVIEW OF SYSTEMS:    CONSTITUTIONAL: No weakness, fevers or chills  EYES/ENT: No visual changes;  No vertigo or throat pain   NECK: No pain or stiffness  RESPIRATORY: No cough, wheezing, hemoptysis; No shortness of breath  CARDIOVASCULAR: No chest pain or palpitations  GASTROINTESTINAL: No abdominal or epigastric pain. No nausea, vomiting, or hematemesis; No diarrhea or constipation. No melena or hematochezia.  GENITOURINARY: No dysuria, frequency or hematuria  NEUROLOGICAL: No numbness or weakness  SKIN: No itching, rashes      ICU Vital Signs Last 24 Hrs  T(C): 37.6 (2023 15:45), Max: 37.9 (2023 01:30)  T(F): 99.6 (2023 15:45), Max: 100.2 (2023 01:30)  HR: 105 (2023 18:00) (68 - 120)  BP: 110/69 (2023 07:15) (110/69 - 143/99)  BP(mean): 86 (2023 07:15) (86 - 114)  ABP: 118/63 (2023 18:00) (88/47 - 127/73)  ABP(mean): 78 (2023 18:00) (58 - 93)  RR: 20 (2023 18:00) (18 - 39)  SpO2: 99% (2023 18:00) (95% - 100%)    O2 Parameters below as of 2023 18:00  Patient On (Oxygen Delivery Method): ventilator    O2 Concentration (%): 30    Mode: AC/ CMV (Assist Control/ Continuous Mandatory Ventilation), RR (machine): 14, TV (machine): 400, FiO2: 40, PEEP: 6, ITime: 1  CVP(mm Hg): --  CO: --  CI: --  PA: --  PA(mean): --  PA(direct): --  PCWP: --  LA: --  RA: --  SVR: --  SVRI: --  PVR: --  PVRI: --  I&O's Summary    2023 07:01  -  2023 07:00  --------------------------------------------------------  IN: 1739.8 mL / OUT: 85 mL / NET: 1654.8 mL    2023 07:01  -  2023 20:18  --------------------------------------------------------  IN: 291.5 mL / OUT: 10 mL / NET: 281.5 mL      CAPILLARY BLOOD GLUCOSE    POCT Blood Glucose.: 228 mg/dL (2023 17:48)      PHYSICAL EXAM:  GENERAL: No acute distress, well-developed  HEAD:  Atraumatic, Normocephalic  EYES: EOMI, PERRLA, conjunctiva and sclera clear  NECK: Supple, no lymphadenopathy, no JVD  CHEST/LUNG: CTAB; No wheezes, rales, or rhonchi  HEART: Regular rate and rhythm. Normal S1/S2. No murmurs, rubs, or gallops  ABDOMEN: Soft, non-tender, non-distended; normal bowel sounds, no organomegaly  EXTREMITIES:  2+ peripheral pulses b/l, No clubbing, cyanosis, or edema  NEUROLOGY: A&O x 3, no focal deficits  SKIN: No rashes or lesions    ============================I/O===========================   I&O's Detail    2023 07:01  -  2023 07:00  --------------------------------------------------------  IN:    Heparin: 8 mL    Heparin: 24 mL    IV PiggyBack: 1500 mL    Lidocaine: 112.5 mL    Propofol: 95.3 mL  Total IN: 1739.8 mL    OUT:    Chest Tube (mL): 10 mL    Voided (mL): 75 mL  Total OUT: 85 mL    Total NET: 1654.8 mL      2023 07:01  -  2023 20:18  --------------------------------------------------------  IN:    Glucerna 1.5: 20 mL    Heparin: 60 mL    IV PiggyBack: 50 mL    Lidocaine: 82.5 mL    Propofol: 79 mL  Total IN: 291.5 mL    OUT:    Chest Tube (mL): 0 mL    Indwelling Catheter - Urethral (mL): 10 mL  Total OUT: 10 mL    Total NET: 281.5 mL        ============================ LABS =========================                        11.9   17.15 )-----------( 284      ( 2023 16:09 )             37.5         132<L>  |  94<L>  |  57<H>  ----------------------------<  258<H>  4.5   |  21<L>  |  3.62<H>    Ca    9.0      2023 16:09  Phos  5.8       Mg     2.1         TPro  6.6  /  Alb  3.2<L>  /  TBili  0.5  /  DBili  x   /  AST  117<H>  /  ALT  114<H>  /  AlkPhos  88      Troponin T, High Sensitivity Result: 64 ng/L (23 @ 20:19)    CKMB Units: 1.7 ng/mL (23 @ 20:19)        LIVER FUNCTIONS - ( 2023 16:09 )  Alb: 3.2 g/dL / Pro: 6.6 g/dL / ALK PHOS: 88 U/L / ALT: 114 U/L / AST: 117 U/L / GGT: x           PT/INR - ( 2023 05:30 )   PT: 18.5 sec;   INR: 1.59 ratio         PTT - ( 2023 19:14 )  PTT:61.3 sec  ABG - ( 2023 05:20 )  pH, Arterial: 7.44  pH, Blood: x     /  pCO2: 36    /  pO2: 144   / HCO3: 24    / Base Excess: 0.6   /  SaO2: 99.6          Blood Gas Arterial, Lactate: 2.8 mmol/L (23 @ 05:20)  Blood Gas Arterial, Lactate: 3.0 mmol/L (23 @ 23:55)  Blood Gas Venous - Lactate: 7.6 mmol/L (23 @ 21:12)  Blood Gas Venous - Lactate: 10.5 mmol/L (23 @ 19:51)    Urinalysis Basic - ( 2023 07:22 )    Color: Light Yellow / Appearance: Clear / S.011 / pH: x  Gluc: x / Ketone: Negative  / Bili: Negative / Urobili: Negative   Blood: x / Protein: 30 mg/dL / Nitrite: Negative   Leuk Esterase: Negative / RBC: 21 /hpf / WBC 3 /HPF   Sq Epi: x / Non Sq Epi: 3 /hpf / Bacteria: Negative      ======================Micro/Rad/Cardio=================  Telemetry: Reviewed   EKG: Reviewed  CXR: Reviewed  Culture: Reviewed   Echo:   Cath:   ======================================================  PAST MEDICAL & SURGICAL HISTORY:  Congestive heart failure (CHF)    ====================ASSESSMENT ==============  87 year old man with history of HFrEF last EF 40-45%, Afib/Aflutter s/p ablation, high grade AV block s/p PPM, T2DM, CKD brought in by EMS following cardiac arrest at home. Found to have VF s/p defib x 2, epi x 3 with ROSC and with course complicated by right-sided pneumothorax s/p chest tube. Patient admitted to CICU for further management post VF arrest.      Plan:  ====================== NEUROLOGY=====================  - Sedated on fentanyl in the ED, transitioned to prop gtt   - Wean as tolerated to assess mental status per ICU protocol    Upper extremity myoclonic jerks  - F/U CTH given initial noted upper extremity intermittent jerking movements. ?anoxia. Per wife she had initiated CPR pretty immediately and called for EMS within 5-10 minutes  - vEEG pending     ==================== RESPIRATORY======================  S/P Witnessed cardiac arrest at home, Intubated in the field for airway protection s/p VFib arrest. Tube exchanged while in the ED.  - Current vent settings: 16/400/6/30  - SBTs as tolerated  - Follow ABGs for vent adjustment as needed    Right-sided Pneumothorax s/p intubation and CPR   S/P pigtail catheter with reinflation, residual pneumo on CXR., malpositioned during transfer   - New right chest tube placed at bedside by surgery   - Daily CXR     Mechanical Ventilation:  Mode: AC/ CMV (Assist Control/ Continuous Mandatory Ventilation)  RR (machine): 14  TV (machine): 400  FiO2: 40  PEEP: 6  ITime: 1  MAP: 10  PIP: 23      ====================CARDIOVASCULAR==================  Admitted w/ ?VT/VFib arrest , S/P defib x2, epi x3  with EMS  - Monitor on tele  - On PPM interrogation appears to have had episode of VT around ~1900 Lido gtt overnight   No further episode   - As per EP, Cont. Lido gtt @1   - Eventual ischemic work up     Hx of AFib/Flutter   - Per outpatient cardiologist patient is supposed to be on carvedilol. Had been taken off meds when he was admitted at [facility] for PNA iso hypotension and required midodrine  - On eliquis 2.5mg BID at home, initiated heparin drip for AC  - Hep gtt for now     HFrEF  - Per outpatient cardiologist was on SGLT2i, spironolactone, Entresto lasix, but was taken off while in the hospital due to hypotension  - Most recent medications per cardiologist were midodrine, lasix, alogliptin and metformin, eliquis 2.5mg BID  - Would not restart GDMT at this time given blood pressures. Continue to reassess ability to reinitiate  - Follow up w/ TTE     CAD s/p stents  - 14 stents per family, unsure of last stent placement  - Will need to follow up on home medication regimen    ===================HEMATOLOGIC/ONC ===================  No acute issues   - continue to monitor H&H/Plts   - Heparin gtt for AC therapy given Afib/Aflutter history    ===================== RENAL =========================  YESICA on CKD in setting of s/p cardiac arrest   - Cr of >2.5  initially, per outpatient cardiologist usually is around 1.5-2.0  - Renal consult (Dr. Rolon) =discussed with family for the potential for HD, Family needs some time to consider for the final decision   - Monitor BUN/Cr, UO and lytes  - Bladder scan: No urine noted     Lactic acidemia  - VBG with pH of 7.20 most recently, lactate of 10.5 on admission, currently downtrending  - Anion gap trending down   - Likely iso cardiac arrest  - Continue to trend for resolution, follow ABG for resolution of acidemia    ==================== GASTROINTESTINAL===================  - Started on TFs Glucerna @ 10 cc/hr    Elevated Liver Enzymes in setting of shocks   - Continue to monitor  - Avoid hepatotoxic meds     =======================    ENDOCRINE  =====================  hx of T2DM (A1c 7.1)  - FS q6hr while NPO with ISS for correction q6hr, 3U NPH q6hr  - Target glucose 140-180    ========================INFECTIOUS DISEASE================  Leukocytosis likely reactive iso VFib arrest  No infection s/s prior to admission   - Continue to monitor WBC count post arrest and monitor for febrile episodes  - Hold off on antibiotics for now, but will send blood cultures, UA        Patient requires continuous monitoring with bedside rhythm monitoring, pulse ox monitoring, and intermittent blood gas analysis. Care plan discussed with ICU care team. Patient remained critical and at risk for life threatening decompensation.  Patient seen, examined and plan discussed with CCU team during rounds.     I have personally provided ____ minutes of critical care time excluding time spent on separate procedures, in addition to initial critical care time provided by the CICU Attending, Dr. Richards.     By signing my name below, I, Maria D'Amico, attest that this documentation has been prepared under the direction and in the presence of ROOSEVELT Schreiber   Electronically signed: Maria D'Amico, Scribe, 23 @ 20:18    I, ROOSEVELT Schreiber , personally performed the services described in this documentation. all medical record entries made by the nubiaibe were at my direction and in my presence. I have reviewed the chart and agree that the record reflects my personal performance and is accurate and complete  Electronically signed: ROOSEVELT Schreiber        SUNITA LINDSAY  MRN-[id number]  Patient is a 87y old  Male who presents with a chief complaint of VFib (2023 16:32)    HPI:  87 year old man with history of HFrEF last EF 40-45%, Afib/Aflutter s/p ablation, high grade AV block s/p PPM, T2DM, CKD brought in by EMS following cardiac arrest at home. Was seated in chair when he suddenly collapsed as witnessed by his wife and was noted to not be breathing or have a pulse. Per wife he slumped in chair, did not fall and hit his head. Wife started CPR, EMS was contacted and upon arrival was assessed to be in VF. Was shocked twice, given three rounds of epi and had ROSC. Intubated in the field and arrived at the ED with pulse. Per family patient also recently admitted to [facility] for HF exacerbation, found to have PNA for which he was treated. Per wife, patient had checked his blood sugar prior to arrest and was 135. Patient's wife believes she called for EMS maybe 5-10 minutes after he went unresponsive.    In the ED, noted to have /73, HR 81, 100% saturation. Elevated WBC to 15.3, HAGMA with lactate of 10.5 on VBG, Pro-BNP of 5819. He had ETT changed and was then noted to not have lung sounds on the right side. Was found to have moderate sized pneumo on CXR, now s/p right-sided chest tube with reinflation of lung seen on CXR. Patient was sedated on fent and admitted to CICU for further management s/p VF arrest.   (2023 21:36)      Hospital Course:  2/3 Transferred to CCU for further management     24 HOUR EVENTS: pt made DNR by wife this evening, initially family in agreement with CRRT however now rescinded consent.     REVIEW OF SYSTEMS: GOSIA      ICU Vital Signs Last 24 Hrs  T(C): 37.6 (2023 15:45), Max: 37.9 (2023 01:30)  T(F): 99.6 (2023 15:45), Max: 100.2 (2023 01:30)  HR: 105 (2023 18:00) (68 - 120)  BP: 110/69 (2023 07:15) (110/69 - 143/99)  BP(mean): 86 (2023 07:15) (86 - 114)  ABP: 118/63 (2023 18:00) (88/47 - 127/73)  ABP(mean): 78 (2023 18:00) (58 - 93)  RR: 20 (2023 18:00) (18 - 39)  SpO2: 99% (2023 18:00) (95% - 100%)    O2 Parameters below as of 2023 18:00  Patient On (Oxygen Delivery Method): ventilator    O2 Concentration (%): 30    Mode: AC/ CMV (Assist Control/ Continuous Mandatory Ventilation), RR (machine): 14, TV (machine): 400, FiO2: 40, PEEP: 6, ITime: 1    I&O's Summary    2023 07:01  -  2023 07:00  --------------------------------------------------------  IN: 1739.8 mL / OUT: 85 mL / NET: 1654.8 mL    2023 07:01  -  2023 20:18  --------------------------------------------------------  IN: 291.5 mL / OUT: 10 mL / NET: 281.5 mL      CAPILLARY BLOOD GLUCOSE    POCT Blood Glucose.: 228 mg/dL (2023 17:48)      ============================I/O===========================   I&O's Detail    2023 07:01  -  2023 07:00  --------------------------------------------------------  IN:    Heparin: 8 mL    Heparin: 24 mL    IV PiggyBack: 1500 mL    Lidocaine: 112.5 mL    Propofol: 95.3 mL  Total IN: 1739.8 mL    OUT:    Chest Tube (mL): 10 mL    Voided (mL): 75 mL  Total OUT: 85 mL    Total NET: 1654.8 mL      2023 07:01  -  2023 20:18  --------------------------------------------------------  IN:    Glucerna 1.5: 20 mL    Heparin: 60 mL    IV PiggyBack: 50 mL    Lidocaine: 82.5 mL    Propofol: 79 mL  Total IN: 291.5 mL    OUT:    Chest Tube (mL): 0 mL    Indwelling Catheter - Urethral (mL): 10 mL  Total OUT: 10 mL    Total NET: 281.5 mL        ============================ LABS =========================                        11.9   17.15 )-----------( 284      ( 2023 16:09 )             37.5     -04    132<L>  |  94<L>  |  57<H>  ----------------------------<  258<H>  4.5   |  21<L>  |  3.62<H>    Ca    9.0      2023 16:09  Phos  5.8     02-04  Mg     2.1     02-04    TPro  6.6  /  Alb  3.2<L>  /  TBili  0.5  /  DBili  x   /  AST  117<H>  /  ALT  114<H>  /  AlkPhos  88  02-04    Troponin T, High Sensitivity Result: 64 ng/L (23 @ 20:19)    CKMB Units: 1.7 ng/mL (23 @ 20:19)        LIVER FUNCTIONS - ( 2023 16:09 )  Alb: 3.2 g/dL / Pro: 6.6 g/dL / ALK PHOS: 88 U/L / ALT: 114 U/L / AST: 117 U/L / GGT: x           PT/INR - ( 2023 05:30 )   PT: 18.5 sec;   INR: 1.59 ratio         PTT - ( 2023 19:14 )  PTT:61.3 sec  ABG - ( 2023 05:20 )  pH, Arterial: 7.44  pH, Blood: x     /  pCO2: 36    /  pO2: 144   / HCO3: 24    / Base Excess: 0.6   /  SaO2: 99.6          Blood Gas Arterial, Lactate: 2.8 mmol/L (23 @ 05:20)  Blood Gas Arterial, Lactate: 3.0 mmol/L (23 @ 23:55)  Blood Gas Venous - Lactate: 7.6 mmol/L (23 @ 21:12)  Blood Gas Venous - Lactate: 10.5 mmol/L (23 @ 19:51)    Urinalysis Basic - ( 2023 07:22 )    Color: Light Yellow / Appearance: Clear / S.011 / pH: x  Gluc: x / Ketone: Negative  / Bili: Negative / Urobili: Negative   Blood: x / Protein: 30 mg/dL / Nitrite: Negative   Leuk Esterase: Negative / RBC: 21 /hpf / WBC 3 /HPF   Sq Epi: x / Non Sq Epi: 3 /hpf / Bacteria: Negative      ======================Micro/Rad/Cardio=================  Telemetry: Reviewed   EKG: Reviewed  CXR: Reviewed  Culture: Reviewed   Echo:   Cath:   ======================================================  PAST MEDICAL & SURGICAL HISTORY:  Congestive heart failure (CHF)    ====================ASSESSMENT ==============  87 year old man with history of HFrEF last EF 40-45%, Afib/Aflutter s/p ablation, high grade AV block s/p PPM, T2DM, CKD brought in by EMS following cardiac arrest at home. Found to have VF s/p defib x 2, epi x 3 with ROSC and with course complicated by right-sided pneumothorax s/p chest tube. Patient admitted to CICU for further management post VF arrest.      Plan:  ====================== NEUROLOGY=====================  - Sedated on fentanyl in the ED, transitioned to prop gtt   - Wean as tolerated to assess mental status per ICU protocol    Upper extremity myoclonic jerks  - F/U CTH given initial noted upper extremity intermittent jerking movements. ?anoxia. Per wife she had initiated CPR pretty immediately and called for EMS within 5-10 minutes  - vEEG pending     ==================== RESPIRATORY======================  S/P Witnessed cardiac arrest at home, Intubated in the field for airway protection s/p VFib arrest. Tube exchanged while in the ED.  - Current vent settings: 16/400/6/30  - SBTs as tolerated  - Follow ABGs for vent adjustment as needed    Right-sided Pneumothorax s/p intubation and CPR   S/P pigtail catheter with reinflation, residual pneumo on CXR., malpositioned during transfer   - New right chest tube placed at bedside by surgery   - Daily CXR     Mechanical Ventilation:  Mode: AC/ CMV (Assist Control/ Continuous Mandatory Ventilation)  RR (machine): 14  TV (machine): 400  FiO2: 40  PEEP: 6  ITime: 1  MAP: 10  PIP: 23      ====================CARDIOVASCULAR==================  Admitted w/ ?VT/VFib arrest , S/P defib x2, epi x3  with EMS  - Monitor on tele  - On PPM interrogation appears to have had episode of VT around ~1900 Lido gtt overnight   No further episode   - As per EP, Cont. Lido gtt @1   - Eventual ischemic work up     Hx of AFib/Flutter   - Per outpatient cardiologist patient is supposed to be on carvedilol. Had been taken off meds when he was admitted at [facility] for PNA iso hypotension and required midodrine  - On eliquis 2.5mg BID at home, initiated heparin drip for AC  - Hep gtt for now     HFrEF  - Per outpatient cardiologist was on SGLT2i, spironolactone, Entresto lasix, but was taken off while in the hospital due to hypotension  - Most recent medications per cardiologist were midodrine, lasix, alogliptin and metformin, eliquis 2.5mg BID  - Would not restart GDMT at this time given blood pressures. Continue to reassess ability to reinitiate  - Follow up w/ TTE     CAD s/p stents  - 14 stents per family, unsure of last stent placement  - Will need to follow up on home medication regimen    ===================HEMATOLOGIC/ONC ===================  No acute issues   - continue to monitor H&H/Plts   - Heparin gtt for AC therapy given Afib/Aflutter history    ===================== RENAL =========================  YESICA on CKD in setting of s/p cardiac arrest   - Cr of >2.5  initially, per outpatient cardiologist usually is around 1.5-2.0  - Renal consult (Dr. Rolon) =discussed with family for the potential for HD, Family needs some time to consider for the final decision   - Monitor BUN/Cr, UO and lytes  - Bladder scan: No urine noted     Lactic acidemia  - VBG with pH of 7.20 most recently, lactate of 10.5 on admission, currently downtrending  - Anion gap trending down   - Likely iso cardiac arrest  - Continue to trend for resolution, follow ABG for resolution of acidemia    ==================== GASTROINTESTINAL===================  - Started on TFs Glucerna @ 10 cc/hr    Elevated Liver Enzymes in setting of shocks   - Continue to monitor  - Avoid hepatotoxic meds     =======================    ENDOCRINE  =====================  hx of T2DM (A1c 7.1)  - FS q6hr while NPO with ISS for correction q6hr, 3U NPH q6hr  - Target glucose 140-180    ========================INFECTIOUS DISEASE================  Leukocytosis likely reactive iso VFib arrest  No infection s/s prior to admission   - Continue to monitor WBC count post arrest and monitor for febrile episodes  - Hold off on antibiotics for now, but will send blood cultures, UA        Patient requires continuous monitoring with bedside rhythm monitoring, pulse ox monitoring, and intermittent blood gas analysis. Care plan discussed with ICU care team. Patient remained critical and at risk for life threatening decompensation.  Patient seen, examined and plan discussed with CCU team during rounds.     I have personally provided ____ minutes of critical care time excluding time spent on separate procedures, in addition to initial critical care time provided by the CICU Attending, Dr. Richards.     By signing my name below, I, Maria D'Amico, attest that this documentation has been prepared under the direction and in the presence of ROOSEVELT Schreiber   Electronically signed: Maria D'Amico, Scribe, 23 @ 20:18    I, ROOSEVELT Schreiber , personally performed the services described in this documentation. all medical record entries made by the anish were at my direction and in my presence. I have reviewed the chart and agree that the record reflects my personal performance and is accurate and complete  Electronically signed: ROOSEVELT Schreiber        SUNITA LINDSAY  MRN-[id number]  Patient is a 87y old  Male who presents with a chief complaint of VFib (2023 16:32)    HPI:  87 year old man with history of HFrEF last EF 40-45%, Afib/Aflutter s/p ablation, high grade AV block s/p PPM, T2DM, CKD brought in by EMS following cardiac arrest at home. Was seated in chair when he suddenly collapsed as witnessed by his wife and was noted to not be breathing or have a pulse. Per wife he slumped in chair, did not fall and hit his head. Wife started CPR, EMS was contacted and upon arrival was assessed to be in VF. Was shocked twice, given three rounds of epi and had ROSC. Intubated in the field and arrived at the ED with pulse. Per family patient also recently admitted to [facility] for HF exacerbation, found to have PNA for which he was treated. Per wife, patient had checked his blood sugar prior to arrest and was 135. Patient's wife believes she called for EMS maybe 5-10 minutes after he went unresponsive.    In the ED, noted to have /73, HR 81, 100% saturation. Elevated WBC to 15.3, HAGMA with lactate of 10.5 on VBG, Pro-BNP of 5819. He had ETT changed and was then noted to not have lung sounds on the right side. Was found to have moderate sized pneumo on CXR, now s/p right-sided chest tube with reinflation of lung seen on CXR. Patient was sedated on fent and admitted to CICU for further management s/p VF arrest.   (2023 21:36)      Hospital Course:  2/3 Transferred to CCU for further management     24 HOUR EVENTS: pt made DNR by wife this evening, initially family in agreement with CRRT however now rescinded consent.     REVIEW OF SYSTEMS: GOSIA      ICU Vital Signs Last 24 Hrs  T(C): 37.6 (2023 15:45), Max: 37.9 (2023 01:30)  T(F): 99.6 (2023 15:45), Max: 100.2 (2023 01:30)  HR: 105 (2023 18:00) (68 - 120)  BP: 110/69 (2023 07:15) (110/69 - 143/99)  BP(mean): 86 (2023 07:15) (86 - 114)  ABP: 118/63 (2023 18:00) (88/47 - 127/73)  ABP(mean): 78 (2023 18:00) (58 - 93)  RR: 20 (2023 18:00) (18 - 39)  SpO2: 99% (2023 18:00) (95% - 100%)    O2 Parameters below as of 2023 18:00  Patient On (Oxygen Delivery Method): ventilator    O2 Concentration (%): 30    Mode: AC/ CMV (Assist Control/ Continuous Mandatory Ventilation), RR (machine): 14, TV (machine): 400, FiO2: 40, PEEP: 6, ITime: 1    I&O's Summary    2023 07:01  -  2023 07:00  --------------------------------------------------------  IN: 1739.8 mL / OUT: 85 mL / NET: 1654.8 mL    2023 07:01  -  2023 20:18  --------------------------------------------------------  IN: 291.5 mL / OUT: 10 mL / NET: 281.5 mL      CAPILLARY BLOOD GLUCOSE    POCT Blood Glucose.: 228 mg/dL (2023 17:48)      ============================I/O===========================   I&O's Detail    2023 07:01  -  2023 07:00  --------------------------------------------------------  IN:    Heparin: 8 mL    Heparin: 24 mL    IV PiggyBack: 1500 mL    Lidocaine: 112.5 mL    Propofol: 95.3 mL  Total IN: 1739.8 mL    OUT:    Chest Tube (mL): 10 mL    Voided (mL): 75 mL  Total OUT: 85 mL    Total NET: 1654.8 mL      2023 07:01  -  2023 20:18  --------------------------------------------------------  IN:    Glucerna 1.5: 20 mL    Heparin: 60 mL    IV PiggyBack: 50 mL    Lidocaine: 82.5 mL    Propofol: 79 mL  Total IN: 291.5 mL    OUT:    Chest Tube (mL): 0 mL    Indwelling Catheter - Urethral (mL): 10 mL  Total OUT: 10 mL    Total NET: 281.5 mL        ============================ LABS =========================                        11.9   17.15 )-----------( 284      ( 2023 16:09 )             37.5     -04    132<L>  |  94<L>  |  57<H>  ----------------------------<  258<H>  4.5   |  21<L>  |  3.62<H>    Ca    9.0      2023 16:09  Phos  5.8     02-04  Mg     2.1     02-04    TPro  6.6  /  Alb  3.2<L>  /  TBili  0.5  /  DBili  x   /  AST  117<H>  /  ALT  114<H>  /  AlkPhos  88  02-04    Troponin T, High Sensitivity Result: 64 ng/L (23 @ 20:19)    CKMB Units: 1.7 ng/mL (23 @ 20:19)        LIVER FUNCTIONS - ( 2023 16:09 )  Alb: 3.2 g/dL / Pro: 6.6 g/dL / ALK PHOS: 88 U/L / ALT: 114 U/L / AST: 117 U/L / GGT: x           PT/INR - ( 2023 05:30 )   PT: 18.5 sec;   INR: 1.59 ratio         PTT - ( 2023 19:14 )  PTT:61.3 sec  ABG - ( 2023 05:20 )  pH, Arterial: 7.44  pH, Blood: x     /  pCO2: 36    /  pO2: 144   / HCO3: 24    / Base Excess: 0.6   /  SaO2: 99.6          Blood Gas Arterial, Lactate: 2.8 mmol/L (23 @ 05:20)  Blood Gas Arterial, Lactate: 3.0 mmol/L (23 @ 23:55)  Blood Gas Venous - Lactate: 7.6 mmol/L (23 @ 21:12)  Blood Gas Venous - Lactate: 10.5 mmol/L (23 @ 19:51)    Urinalysis Basic - ( 2023 07:22 )    Color: Light Yellow / Appearance: Clear / S.011 / pH: x  Gluc: x / Ketone: Negative  / Bili: Negative / Urobili: Negative   Blood: x / Protein: 30 mg/dL / Nitrite: Negative   Leuk Esterase: Negative / RBC: 21 /hpf / WBC 3 /HPF   Sq Epi: x / Non Sq Epi: 3 /hpf / Bacteria: Negative      ======================Micro/Rad/Cardio=================  Telemetry: Reviewed   EKG: Reviewed  CXR: Reviewed  Culture: Reviewed   Echo:   Cath:   ======================================================  PAST MEDICAL & SURGICAL HISTORY:  Congestive heart failure (CHF)    ====================ASSESSMENT ==============  87 year old man with history of HFrEF last EF 40-45%, Afib/Aflutter s/p ablation, high grade AV block s/p PPM, T2DM, CKD brought in by EMS following cardiac arrest at home. Found to have VF s/p defib x 2, epi x 3 with ROSC and with course complicated by right-sided pneumothorax s/p chest tube. Patient admitted to CICU for further management post VF arrest.      Plan:  ====================== NEUROLOGY=====================  - Sedated on fentanyl in the ED, transitioned to prop gtt   - Wean as tolerated to assess mental status per ICU protocol    Upper extremity myoclonic jerks  - F/U CTH given initial noted upper extremity intermittent jerking movements. ?anoxia. Per wife she had initiated CPR pretty immediately and called for EMS within 5-10 minutes  - vEEG pending     ==================== RESPIRATORY======================  S/P Witnessed cardiac arrest at home, Intubated in the field for airway protection s/p VFib arrest. Tube exchanged while in the ED.  - Current vent settings: 16/400/6/30  - SBTs as tolerated  - Follow ABGs for vent adjustment as needed    Right-sided Pneumothorax s/p intubation and CPR   S/P pigtail catheter with reinflation, residual pneumo on CXR., malpositioned during transfer   - New right chest tube placed at bedside by surgery   - Daily CXR     Mechanical Ventilation:  Mode: AC/ CMV (Assist Control/ Continuous Mandatory Ventilation)  RR (machine): 14  TV (machine): 400  FiO2: 40  PEEP: 6  ITime: 1  MAP: 10  PIP: 23      ====================CARDIOVASCULAR==================  Admitted w/ ?VT/VFib arrest , S/P defib x2, epi x3  with EMS  - Monitor on tele  - On PPM interrogation appears to have had episode of VT around ~1900 Lido gtt overnight   No further episodes   - As per EP, Cont. Lido gtt @1   - Eventual ischemic work up     Hx of AFib/Flutter   - Per outpatient cardiologist patient is supposed to be on carvedilol. Had been taken off meds when he was admitted at [facility] for PNA iso hypotension and required midodrine  - On eliquis 2.5mg BID at home, initiated heparin drip for AC  - Hep gtt for now     HFrEF  - Per outpatient cardiologist was on SGLT2i, spironolactone, Entresto lasix, but was taken off while in the hospital due to hypotension  - Most recent medications per cardiologist were midodrine, lasix, alogliptin and metformin, eliquis 2.5mg BID  - Would not restart GDMT at this time given blood pressures. Continue to reassess ability to reinitiate  - Follow up w/ TTE     CAD s/p stents  - 14 stents per family, unsure of last stent placement  - Will need to follow up on home medication regimen    ===================HEMATOLOGIC/ONC ===================  No acute issues   - continue to monitor H&H/Plts   - Heparin gtt for AC therapy given Afib/Aflutter history    ===================== RENAL =========================  YESICA on CKD in setting of s/p cardiac arrest   - Cr of >2.5  initially, per outpatient cardiologist usually is around 1.5-2.0  - Renal consult (Dr. Rolon) =discussed with family for the potential for HD, Family needs some time to consider for the final decision   - initially family consented for HD, however they have now reconsidered and do not think this is what pt would want, consent rescinded.   - Monitor BUN/Cr, UO and lytes  - Bladder scan: No urine noted     Lactic acidemia  - VBG with pH of 7.20 most recently, lactate of 10.5 on admission, currently downtrending  - Anion gap trending down   - Likely iso cardiac arrest  - Continue to trend for resolution, follow ABG for resolution of acidemia    ==================== GASTROINTESTINAL===================  - Started on TFs Glucerna @ 10 cc/hr    Elevated Liver Enzymes in setting of shocks   - Continue to monitor  - Avoid hepatotoxic meds     =======================    ENDOCRINE  =====================  hx of T2DM (A1c 7.1)  - FS q6hr while NPO with ISS for correction q6hr, 3U NPH q6hr  - Target glucose 140-180    ========================INFECTIOUS DISEASE================  Leukocytosis likely reactive iso VFib arrest  No infection s/s prior to admission   - Continue to monitor WBC count post arrest and monitor for febrile episodes  - Hold off on antibiotics for now, but will send blood cultures, UA      Ethics  - wife completed MOLST over phone for DNR       Patient requires continuous monitoring with bedside rhythm monitoring, pulse ox monitoring, and intermittent blood gas analysis. Care plan discussed with ICU care team. Patient remained critical and at risk for life threatening decompensation.  Patient seen, examined and plan discussed with CCU team during rounds.     I have personally provided 35 minutes of critical care time excluding time spent on separate procedures, in addition to initial critical care time provided by the CICU Attending, Dr. Richards.     By signing my name below, I, Maria D'Amico, attest that this documentation has been prepared under the direction and in the presence of ROOSEVELT Schreiber   Electronically signed: Maria D'Amico, Scribe, 23 @ 20:18    I, ROOSEVELT Schreiber , personally performed the services described in this documentation. all medical record entries made by the nubiaibzeus were at my direction and in my presence. I have reviewed the chart and agree that the record reflects my personal performance and is accurate and complete  Electronically signed: ROOSEVELT Schreiber

## 2023-02-04 NOTE — CONSULT NOTE ADULT - SUBJECTIVE AND OBJECTIVE BOX
CARDIOLOGY FELLOW CONSULT NOTE    HPI:  87 year old man with history of HFrEF last EF 40-45%, Afib/Aflutter s/p ablation, high grade AV block s/p [employer] dual camber PPM, CAD (s/p 14 stents as per report), T2DM, CKD brought in by EMS following cardiac arrest at home. Was seated in chair when he suddenly collapsed as witnessed by his wife and was noted to not be breathing or have a pulse. Per wife he slumped in chair, did not fall and hit his head. Wife started CPR, EMS was contacted and upon arrival was assessed to be in VF. Was shocked twice, given three rounds of epi and had ROSC. Intubated in the field and arrived at the ED with pulse. Per family patient also recently admitted to [facility] for HF exacerbation, found to have PNA for which he was treated. Per wife, patient had checked his blood sugar prior to arrest and was 135. Patient's wife believes she called for EMS maybe 5-10 minutes after he went unresponsive.      PMHx:   Congestive heart failure (CHF)    Allergies:  No Known Allergies    Home Meds:    Current Medications:   chlorhexidine 0.12% Liquid 15 milliLiter(s) Oral Mucosa every 12 hours  chlorhexidine 2% Cloths 1 Application(s) Topical at bedtime  dextrose 5%. 1000 milliLiter(s) IV Continuous <Continuous>  dextrose 5%. 1000 milliLiter(s) IV Continuous <Continuous>  dextrose 50% Injectable 25 Gram(s) IV Push once  dextrose 50% Injectable 12.5 Gram(s) IV Push once  dextrose 50% Injectable 25 Gram(s) IV Push once  dextrose Oral Gel 15 Gram(s) Oral once PRN  glucagon  Injectable 1 milliGRAM(s) IntraMuscular once  heparin  Infusion 800 Unit(s)/Hr IV Continuous <Continuous>  insulin lispro (ADMELOG) corrective regimen sliding scale   SubCutaneous every 6 hours  lidocaine   Infusion 1 mG/Min IV Continuous <Continuous>  propofol Infusion 25 MICROgram(s)/kG/Min IV Continuous <Continuous>    FAMILY HISTORY:    ROS:  Unable to participate in ROS due to mental status    Physical Exam:  T(F): 100.2 (02-04), Max: 100.2 (02-04)  HR: 118 (02-04) (68 - 120)  BP: 122/78 (02-03) (116/73 - 143/99)  RR: 24 (02-04)  SpO2: 100% (02-04)    GENERAL: Intubated and sedated  HEAD:  Atraumatic, Normocephalic.  EYES: Pinpoint pupils, minimally reactive to light  NECK: Supple, No JVD.  CHEST/LUNG: Mechanical breath sounds clear to auscultation bilaterally; No rales, rhonchi, wheezing, or rubs.   HEART: Regular rate and rhythm; No murmurs, rubs, or gallops.  ABDOMEN: Bowel sounds present; Soft, Nontender, Nondistended.   EXTREMITIES:  2+ Peripheral Pulses, brisk capillary refill. No clubbing, cyanosis, or edema.  PSYCH: Sedated  SKIN: No rashes or lesions.    ECG: Personally reviewed    Cath: Personally reviewed    CXR: Personally reviewed    Labs: Personally reviewed                        12.5   20.10 )-----------( 338      ( 04 Feb 2023 00:25 )             39.2     02-04    133<L>  |  92<L>  |  51<H>  ----------------------------<  396<H>  3.4<L>   |  20<L>  |  2.52<H>    Ca    9.3      04 Feb 2023 00:24  Phos  5.9     02-04  Mg     2.1     02-04    TPro  7.1  /  Alb  3.8  /  TBili  0.7  /  DBili  x   /  AST  278<H>  /  ALT  161<H>  /  AlkPhos  113  02-04    PT/INR - ( 04 Feb 2023 00:57 )   PT: 18.3 sec;   INR: 1.57 ratio      PTT - ( 04 Feb 2023 00:57 )  PTT:29.9 sec    CARDIAC MARKERS ( 03 Feb 2023 20:19 )  64 ng/L / x     / x     / x     / x     / 1.7 ng/mL    Serum Pro-Brain Natriuretic Peptide: 5819 pg/mL (02-03 @ 20:19) CARDIOLOGY FELLOW CONSULT NOTE    HPI:  87 year old man with history of HFrEF last EF 40-45%, Afib/Aflutter s/p ablation, high grade AV block s/p [employer] dual camber PPM, CAD (s/p 14 stents as per report), T2DM, CKD brought in by EMS following cardiac arrest at home. Was seated in chair when he suddenly collapsed as witnessed by his wife and was noted to not be breathing or have a pulse. Per wife he slumped in chair, did not fall and hit his head. Wife started CPR, EMS was contacted and upon arrival was assessed to be in VF. Was shocked twice, given three rounds of epi and had ROSC. Intubated in the field and arrived at the ED with pulse. Per family patient also recently admitted to [facility] for HF exacerbation, found to have PNA for which he was treated. Per wife, patient had checked his blood sugar prior to arrest and was 135. Patient's wife believes she called for EMS maybe 5-10 minutes after he went unresponsive.    PMHx:   Congestive heart failure (CHF)    Allergies:  No Known Allergies    Home Meds:    Current Medications:   chlorhexidine 0.12% Liquid 15 milliLiter(s) Oral Mucosa every 12 hours  chlorhexidine 2% Cloths 1 Application(s) Topical at bedtime  dextrose 5%. 1000 milliLiter(s) IV Continuous <Continuous>  dextrose 5%. 1000 milliLiter(s) IV Continuous <Continuous>  dextrose 50% Injectable 25 Gram(s) IV Push once  dextrose 50% Injectable 12.5 Gram(s) IV Push once  dextrose 50% Injectable 25 Gram(s) IV Push once  dextrose Oral Gel 15 Gram(s) Oral once PRN  glucagon  Injectable 1 milliGRAM(s) IntraMuscular once  heparin  Infusion 800 Unit(s)/Hr IV Continuous <Continuous>  insulin lispro (ADMELOG) corrective regimen sliding scale   SubCutaneous every 6 hours  lidocaine   Infusion 1 mG/Min IV Continuous <Continuous>  propofol Infusion 25 MICROgram(s)/kG/Min IV Continuous <Continuous>    FAMILY HISTORY:    ROS:  Unable to participate in ROS due to mental status    Physical Exam:  T(F): 100.2 (02-04), Max: 100.2 (02-04)  HR: 118 (02-04) (68 - 120)  BP: 122/78 (02-03) (116/73 - 143/99)  RR: 24 (02-04)  SpO2: 100% (02-04)    GENERAL: Intubated and sedated  HEAD:  Atraumatic, Normocephalic.  EYES: Pinpoint pupils, minimally reactive to light  NECK: Supple, No JVD.  CHEST/LUNG: Mechanical breath sounds clear to auscultation bilaterally; No rales, rhonchi, wheezing, or rubs.   HEART: Regular rate and rhythm; No murmurs, rubs, or gallops.  ABDOMEN: Bowel sounds present; Soft, Nontender, Nondistended.   EXTREMITIES:  2+ Peripheral Pulses, brisk capillary refill. No clubbing, cyanosis, or edema.  PSYCH: Sedated  SKIN: No rashes or lesions.    ECG: Personally reviewed    Cath: Personally reviewed    CXR: Personally reviewed    Labs: Personally reviewed                        12.5   20.10 )-----------( 338      ( 04 Feb 2023 00:25 )             39.2     02-04    133<L>  |  92<L>  |  51<H>  ----------------------------<  396<H>  3.4<L>   |  20<L>  |  2.52<H>    Ca    9.3      04 Feb 2023 00:24  Phos  5.9     02-04  Mg     2.1     02-04    TPro  7.1  /  Alb  3.8  /  TBili  0.7  /  DBili  x   /  AST  278<H>  /  ALT  161<H>  /  AlkPhos  113  02-04    PT/INR - ( 04 Feb 2023 00:57 )   PT: 18.3 sec;   INR: 1.57 ratio      PTT - ( 04 Feb 2023 00:57 )  PTT:29.9 sec    CARDIAC MARKERS ( 03 Feb 2023 20:19 )  64 ng/L / x     / x     / x     / x     / 1.7 ng/mL    Serum Pro-Brain Natriuretic Peptide: 5819 pg/mL (02-03 @ 20:19)

## 2023-02-04 NOTE — PROCEDURE NOTE - ADDITIONAL PROCEDURE DETAILS
PPM is working appropriately with normal sensing, threshold and impedence. Presenting rhythm AS- with underlying rhythm of CHB.    Notable episodes  1. At 7.01PM with ventricular rates of 202 (cycle length 297) lasted for 9m 3s, c/f possible VT  2. 7.27PM NSVT for 3s    No parameter changes were made.     Full EP note to follow

## 2023-02-04 NOTE — CONSULT NOTE ADULT - ASSESSMENT
87 year old man with history of HFrEF last EF 40-45%, Afib/Aflutter s/p ablation, high grade AV block s/p PPM, T2DM, CKD brought in by EMS following cardiac arrest at home.  EMS was contacted and upon arrival was assessed to be in VF. Was shocked twice, given three rounds of epi and had ROSC. Intubated in the field and arrived at the ED with pulse.  recently admitted to [facility] for HF exacerbation, found to have PNA for which he was treated. pt had been on midodrine and demadex after his recent hospitalization as per [facility] discharge papers family revealed.   In the ED, noted to have /73, HR 81, 100% saturation. Elevated WBC to 15.3, HAGMA with lactate of 10.5 on VBG, Pro-BNP of 5819. He had ETT changed and was then noted to not have lung sounds on the right side. Was found to have moderate sized pneumo on CXR, now s/p right-sided chest tube with reinflation of lung seen on CXR. Patient was sedated on fent and admitted to CICU for further management s/p VF arrest.      1- YESICA  2- CAD  3- pneumothorax  4- s/p V fib cardiac arrest  5- respiratory failure     pt with oliguria. will need dialysis soon if lytes do not improve and pt remains oliguric   cont lidocaine drip for V fib arrest  vent support  strict I/O  chest tube   hypotension if recurs can attempt neosynephrine drip   strict I/O  follow lytes   d/w CICU team   d/w pt family    87 year old man with history of HFrEF last EF 40-45%, Afib/Aflutter s/p ablation, high grade AV block s/p PPM, T2DM, CKD brought in by EMS following cardiac arrest at home.  EMS was contacted and upon arrival was assessed to be in VF. Was shocked twice, given three rounds of epi and had ROSC. Intubated in the field and arrived at the ED with pulse.  recently admitted to [facility] for HF exacerbation, found to have PNA for which he was treated. pt had been on midodrine and demadex after his recent hospitalization as per [facility] discharge papers family revealed.   In the ED, noted to have /73, HR 81, 100% saturation. Elevated WBC to 15.3, HAGMA with lactate of 10.5 on VBG, Pro-BNP of 5819. He had ETT changed and was then noted to not have lung sounds on the right side. Was found to have moderate sized pneumo on CXR, now s/p right-sided chest tube with reinflation of lung seen on CXR. Patient was sedated on fent and admitted to CICU for further management s/p VF arrest.      1- YESICA  2- CAD  3- pneumothorax  4- s/p V fib cardiac arrest  5- respiratory failure     pt with oliguria. will need dialysis soon if lytes do not improve and pt remains oliguric   cont lidocaine drip for V fib arrest  vent support  strict I/O  chest tube   hypotension if recurs can attempt neosynephrine drip   strict I/O  follow lytes   d/w CICU team   d/w pt family       addendum:   still had no uo d/w ctu team and to receive bumec 4 mg ivp if no improvement then likely by am will need hd. trend lytes. d/w pt wife re: hd and consent obtained placed in chart

## 2023-02-04 NOTE — CONSULT NOTE ADULT - ASSESSMENT
87 year old man with history of HFrEF last EF 40-45%, Afib/Aflutter s/p ablation, high grade AV block s/p [employer] dual camber PPM, CAD (s/p 14 stents as per report), T2DM, CKD brought in by EMS following cardiac arrest at home.     Patient had a reported VF arrest (no strips available) and required 2 external shocks and 3 rounds of epinephrine before ROSC was achieved. Interrogation of the device shows high ventricular rate concerning for VT/VF at rates of 200s aligning with the time of onset of reported arrest.     Recommendations    #VT/VF arrest  - Start lidocaine drip at 1mg/min given extensive history of CAD  - Full med rec in the morning  - Keep pads on  - TTE  - Keep K > 4 and Mg > 2  - Continue telemetry monitoring    Recommendations are preliminary until attending attestation.    Indra Fernandez MD  Cardiology Fellow- PGY 4 87 year old man with history of HFrEF last EF 40-45%, Afib/Aflutter s/p ablation, high grade AV block s/p [employer] dual camber PPM, CAD (s/p 14 stents as per report), T2DM, CKD brought in by EMS following cardiac arrest at home.     Patient had a reported VF arrest (no strips available) and required 2 external shocks and 3 rounds of epinephrine before ROSC was achieved. Interrogation of the device shows high ventricular rate concerning for VT/VF at rates of 200s aligning with the time of onset of reported arrest.     ECG: A sensed V-paced  Telemetry monitoring: V-paced rhythm     Recommendations    #VT/VF arrest  - Start lidocaine drip at 1mg/min given extensive history of CAD  - Full med rec in the morning  - Keep pads on  - TTE  - Keep K > 4 and Mg > 2  - Continue telemetry monitoring    Recommendations are preliminary until attending attestation.    Indra Fernandez MD  Cardiology Fellow- PGY 4 87 year old man with history of HFrEF last EF 40-45%, Afib/Aflutter s/p ablation, high grade AV block s/p [employer] dual camber PPM, CAD (s/p 14 stents as per report), T2DM, CKD brought in by EMS following cardiac arrest at home.     Patient had a reported VF arrest (no strips available) and required 2 external shocks and 3 rounds of epinephrine before ROSC was achieved. Interrogation of the device shows high ventricular rate concerning for VT/VF at rates of 200s aligning with the time of onset of reported arrest. He was recently admitted to [facility] with PNA. Patient currently has a chest tube in place for a pneumothorax as a c/b of CPR.     ECG: A sensed V-paced  Telemetry monitoring: V-paced rhythm     Recommendations    #VT/VF arrest  - Start lidocaine drip at 1mg/min given extensive history of CAD  - Full med rec in the morning  - Keep pads on  - TTE  - Keep K > 4 and Mg > 2  - Continue telemetry monitoring    Recommendations are preliminary until attending attestation.    Indra Fernandez MD  Cardiology Fellow- PGY 4

## 2023-02-04 NOTE — CONSULT NOTE ADULT - ATTENDING COMMENTS
Patient seen and examined agree with above note as modified, where appropriate, by me. Right PTx s/p ppm. PT had pigtail placed which dislodged with resultant recurrent ptx. PT now s/p new pigtail with reexpansion of lung. Small air leak. Continue cxt to suction.
87 year old man with history of HFrEF last EF 40-45%, Afib/Aflutter s/p ablation, high grade AV block s/p [employer] dual camber PPM, CAD (s/p 14 stents as per report), T2DM, CKD brought in by EMS following cardiac arrest at home. Device noted to have episode of VT. Unclear trigger at this time. Agree with Lidocaine. Please repeat TTE. Will continue to follow.

## 2023-02-04 NOTE — PROGRESS NOTE ADULT - ASSESSMENT
87 year old man with history of HFrEF last EF 40-45%, Afib/Aflutter s/p ablation, high grade AV block s/p [employer] dual camber PPM, CAD (s/p 14 stents as per report), T2DM, CKD brought in by EMS following cardiac arrest at home.     Patient had a reported VF arrest (no strips available) and required 2 external shocks and 3 rounds of epinephrine before ROSC was achieved. Interrogation of the device shows high ventricular rate concerning for VT/VF at rates of 200s aligning with the time of onset of reported arrest. He was recently admitted to [facility] with PNA. Patient currently has a chest tube in place for a pneumothorax as a c/b of CPR.     1. VT arrest  2. High grade AVB s/p SJM PPM  3. CAD    - No further VT  - Remains Intubated and sedated  - Right PTX s/p pigtail  - Continue Lidocaine drip at 1mg/min given extensive history of CAD while intubated  - TTE pending  - Ischemic eval pending clinical course  - Close telemetry monitoring  - Maintain K>4.0 and Mg>2.0

## 2023-02-04 NOTE — PROGRESS NOTE ADULT - ASSESSMENT
87 year old man with history of HFrEF last EF 40-45%, Afib/Aflutter s/p ablation, high grade AV block s/p PPM, T2DM, CKD brought in by EMS following cardiac arrest at home. Found to have VF s/p defib x 2, epi x 3 with ROSC and with course complicated by right-sided pneumothorax s/p chest tube. Patient admitted to CICU for further management post VF arrest.    Neuro  #Sedated  - Sedated on fentanyl in the ED, transitioned to prop  - Wean as tolerated to assess mental status per ICU protocol    #Upper extremity myoclonic jerks  - F/U CTH given initial noted upper extremity intermittent jerking movements. ?anoxia. Per wife she had initiated CPR pretty immediately and called for EMS within 5-10 minutes  - vEEG ordered      Respiratory  #Intubated  - Intubated in the field for airway protection s/p VFib arrest. Tube exchanged while in the ED.  - Current vent settings: 16/400/6/40  - SBTs as tolerated  - Follow ABGs for vent adjustment as needed    #Right-sided Pneumothorax  - Found to have right-sided pneumothorax on CXR possibly iso intubation versus CPR  - S/P pigtail catheter with reinflation, residual pneumo on CXR. Monitor CXR for resolution. Chest tube to low wall suction for now      Cardiovascular  #VFib arrest  - Reported VFib by EMS  - S/P defib x2, epi x3  with EMS  - Monitor on tele  - On PPM interrogation appears to have had episode of VT around ~1900. Starting lidocaine at rate of 1mg/min  - F/U TTE  - Not requiring pressor support at this time while sedated  - Currently in paced rhythm  - F/U lipid profile  - EP consult  - Troponin of 64, follow to peak    #AFib/Flutter  - Per outpatient cardiologist patient is supposed to be on carvedilol. Had been taken off meds when he was admitted at [facility] for PNA iso hypotension and required midodrine  - Hold carvedilol iso low blood pressures, likely vasoplegia while on prop. Continue to monitor for ability to restart  - On eliquis 2.5mg BID at home, will initiate heparin drip for AC    #HFrEF  - Per outpatient cardiologist was on SGLT2i, spironolactone, Entresto lasix, but was taken off while in the hospital due to hypotension  - Most recent medications per cardiologist were midodrine, lasix, alogliptin and metformin, eliquis 2.5mg BID  - Would not restart GDMT at this time given blood pressures. Continue to reassess ability to reinitiate  - Pro- BNP of ~5800  - F/U TTE      #CAD s/p stents  - 14 stents per family, unsure of last stent placement  - Will need to follow up on home medication regimen      Renal  #YESICA on CKD  - Cr of 1.84 initially, per outpatient cardiologist usually is around 1.5-2.0  - Most recently ~2.5  - Possibly ATN iso arrest and low flow state  - UOP ~5-10cc/hr thus far  - Monitor I&Os  - S/P 500cc bolus x 2    #Lactic acidemia, HAGMA  - VBG with pH of 7.20 most recently, lactate of 10.5 on admission, currently downtrending  - Anion gap trending down   - Likely iso cardiac arrest  - Continue to trend for resolution, follow ABG for resolution of acidemia      GI  - Dullness to percussion on RLQ, but is soft to palpation. F/U abdominal xray  - NPO for now iso intubation  - Initiate tube feeds when patient more stable.    #Elevated Liver Enzymes  - Elevated LFTs, likely iso low flow state  - Continue to monitor      Endocrine  #T2DM  - FS q6hr while NPO with ISS for correction q6hr, 3U NPH q6hr  - A1C 7.1  - Prior to arrest had sugar of 135 per wife      ID  - Borderline temperature on esophageal probe  - Elevated WBC, still likely reactive iso VFib arrest  - Continue to monitor WBC count post arrest and monitor for febrile episodes  - Hold off on antibiotics for now, but will send blood cultures, UA      Heme  - No active issues, continue to monitor hemoglobin  - Heparin drip for AC given AFib/Flutter history   87 year old man with history of HFrEF last EF 40-45%, Afib/Aflutter s/p ablation, high grade AV block s/p PPM, T2DM, CKD brought in by EMS following cardiac arrest at home. Found to have VF s/p defib x 2, epi x 3 with ROSC and with course complicated by right-sided pneumothorax s/p chest tube. Patient admitted to CICU for further management post VF arrest.    #NEURO  - Sedated on fentanyl in the ED, transitioned to prop gtt   - Wean as tolerated to assess mental status per ICU protocol  - No response off Propofol, jerky movement noted.     Upper extremity myoclonic jerks  - F/U CTH given initial noted upper extremity intermittent jerking movements. ?anoxia. Per wife she had initiated CPR pretty immediately and called for EMS within 5-10 minutes  - vEEG pending     #RESP: S/P Witnessed cardiac arrest at home, Intubated in the field for airway protection s/p VFib arrest. Tube exchanged while in the ED.  - Current vent settings: 16/400/6/30  - SBTs as tolerated  - Follow ABGs for vent adjustment as needed    Right-sided Pneumothorax s/p intubation and CPR   S/P pigtail catheter with reinflation, residual pneumo on CXR., malpositioned during transfer   - New right chest tube placed at bedside by surgery   - Daily CXR     #CARDIOVASCULAR : Admitted w/ ?VT/VFib arrest , S/P defib x2, epi x3  with EMS  - Monitor on tele  - On PPM interrogation appears to have had episode of VT around ~1900 [place] gtt overnight   No further episode   - As per EP, Cont. [place] gtt @1   - Eventual ischemic work up     Hx of AFib/Flutter   - Per outpatient cardiologist patient is supposed to be on carvedilol. Had been taken off meds when he was admitted at [facility] for PNA iso hypotension and required midodrine  - On eliquis 2.5mg BID at home, will initiate heparin drip for AC  - Hep gtt for now     HFrEF  - Per outpatient cardiologist was on SGLT2i, spironolactone, Entresto lasix, but was taken off while in the hospital due to hypotension  - Most recent medications per cardiologist were midodrine, lasix, alogliptin and metformin, eliquis 2.5mg BID  - Would not restart GDMT at this time given blood pressures. Continue to reassess ability to reinitiate  - Follow up w/ TTE     CAD s/p stents  - 14 stents per family, unsure of last stent placement  - Will need to follow up on home medication regimen    #RENAL: YESICA on CKD in setting of s/p cardiac arrest   - Cr of >2.5  initially, per outpatient cardiologist usually is around 1.5-2.0  - Renal consult (Dr. Rolon) =discussed with family for the potential for HD, Family needs some time to consider for the final decision   - Monitor BUN/Cr, UO and lytes  - Bladder scan: No urine noted     Lactic acidemia  - VBG with pH of 7.20 most recently, lactate of 10.5 on admission, currently downtrending  - Anion gap trending down   - Likely iso cardiac arrest  - Continue to trend for resolution, follow ABG for resolution of acidemia    # GI  - NPO for now iso intubation  - Initiate tube feeds when patient more stable.    Elevated Liver Enzymes in setting of shocks   - Continue to monitor  - Avoid hepatotoxic meds     # Endocrine: hx of T2DM (A1c 7.1)  - FS q6hr while NPO with ISS for correction q6hr, 3U NPH q6hr  - Target glucose 140-180    # ID leukocytosis likely reactive iso VFib arrest  No infection s/s prior to admission   - Continue to monitor WBC count post arrest and monitor for febrile episodes  - Hold off on antibiotics for now, but will send blood cultures, UA    # Heme  - No active issues, continue to monitor hemoglobin  - Heparin drip for AC given AFib/Flutter history    MIS: Full code    Lines Right radial (2/3-) right pigtail CT(2/4-)

## 2023-02-04 NOTE — PROGRESS NOTE ADULT - SUBJECTIVE AND OBJECTIVE BOX
24H hour events: No acute events    MEDICATIONS:  heparin  Infusion 800 Unit(s)/Hr IV Continuous <Continuous>  lidocaine   Infusion 1 mG/Min IV Continuous <Continuous>  propofol Infusion 25 MICROgram(s)/kG/Min IV Continuous <Continuous>  dextrose 50% Injectable 25 Gram(s) IV Push once  dextrose 50% Injectable 12.5 Gram(s) IV Push once  dextrose 50% Injectable 25 Gram(s) IV Push once  dextrose Oral Gel 15 Gram(s) Oral once PRN  glucagon  Injectable 1 milliGRAM(s) IntraMuscular once  insulin lispro (ADMELOG) corrective regimen sliding scale   SubCutaneous every 6 hours  insulin NPH human recombinant 3 Unit(s) SubCutaneous every 6 hours  chlorhexidine 0.12% Liquid 15 milliLiter(s) Oral Mucosa every 12 hours  chlorhexidine 2% Cloths 1 Application(s) Topical at bedtime  dextrose 5%. 1000 milliLiter(s) IV Continuous <Continuous>  dextrose 5%. 1000 milliLiter(s) IV Continuous <Continuous>      REVIEW OF SYSTEMS:  Complete 12 point ROS negative.    PHYSICAL EXAM:  T(C): 37.7 (02-04-23 @ 07:00), Max: 37.9 (02-04-23 @ 01:30)  HR: 115 (02-04-23 @ 10:00) (68 - 120)  BP: 110/69 (02-04-23 @ 07:15) (110/69 - 143/99)  RR: 18 (02-04-23 @ 10:00) (15 - 39)  SpO2: 100% (02-04-23 @ 10:00) (95% - 100%)  Wt(kg): --  I&O's Summary    03 Feb 2023 07:01  -  04 Feb 2023 07:00  --------------------------------------------------------  IN: 1739.8 mL / OUT: 85 mL / NET: 1654.8 mL    04 Feb 2023 07:01  -  04 Feb 2023 10:36  --------------------------------------------------------  IN: 39.3 mL / OUT: 0 mL / NET: 39.3 mL    LABS:	 	    CBC Full  -  ( 04 Feb 2023 05:30 )  WBC Count : 18.03 K/uL  Hemoglobin : 11.9 g/dL  Hematocrit : 37.0 %  Platelet Count - Automated : 311 K/uL  Mean Cell Volume : 93.0 fl  Mean Cell Hemoglobin : 29.9 pg  Mean Cell Hemoglobin Concentration : 32.2 gm/dL  Auto Neutrophil # : x  Auto Lymphocyte # : x  Auto Monocyte # : x  Auto Eosinophil # : x  Auto Basophil # : x  Auto Neutrophil % : x  Auto Lymphocyte % : x  Auto Monocyte % : x  Auto Eosinophil % : x  Auto Basophil % : x    02-04    132<L>  |  93<L>  |  51<H>  ----------------------------<  310<H>  4.2   |  20<L>  |  2.86<H>  02-04    133<L>  |  92<L>  |  51<H>  ----------------------------<  396<H>  3.4<L>   |  20<L>  |  2.52<H>    Ca    9.0      04 Feb 2023 05:28  Ca    9.3      04 Feb 2023 00:24  Phos  4.8     02-04  Phos  5.9     02-04  Mg     2.1     02-04  Mg     2.1     02-04    TPro  6.6  /  Alb  3.3  /  TBili  0.5  /  DBili  x   /  AST  169<H>  /  ALT  135<H>  /  AlkPhos  95  02-04  TPro  7.1  /  Alb  3.8  /  TBili  0.7  /  DBili  x   /  AST  278<H>  /  ALT  161<H>  /  AlkPhos  113  02-04      proBNP: Serum Pro-Brain Natriuretic Peptide: 5819 pg/mL (02-03 @ 20:19)    TELEMETRY: ST with ventricular pacing up to MTR

## 2023-02-04 NOTE — CHART NOTE - NSCHARTNOTEFT_GEN_A_CORE
PTs wife and son called unit over the phone. Stated they reconsidered their decision to consent for hemodialysis and they would like to rescind the consent. Pts wife stated he was a very proud, independent man who would not want these types of interventions. I explained to Elo and her son that they have the option to make the patient DNR if they would like and explained what this would mean.     Pts wife would like to make pt DNR at this time. MOLST form completed and placed in chart.   Hemodialysis consent rescinded.       Elo Holland [phone]      Dottie Live PA-C

## 2023-02-04 NOTE — PROCEDURE NOTE - NSPROCDETAILS_GEN_ALL_CORE
location identified, draped/prepped, sterile technique used, needle inserted/introduced/positive blood return obtained via catheter/connected to a pressurized flush line/sutured in place/hemostasis with direct pressure, dressing applied/Seldinger technique/all materials/supplies accounted for at end of procedure
Seldinger technique/dressing applied/secured in place/sterile dressing applied/percutaneous/thoracostomy tube placed percutaneously/ultrasound assessment of fluid (location)

## 2023-02-04 NOTE — CONSULT NOTE ADULT - SUBJECTIVE AND OBJECTIVE BOX
[facility]  [phone]  NEPHROLOGY      INITIAL CONSULT NOTE  --------------------------------------------------------------------------------  HPI:      87 year old man with history of HFrEF last EF 40-45%, Afib/Aflutter s/p ablation, high grade AV block s/p PPM, T2DM, CKD brought in by EMS following cardiac arrest at home. Was seated in chair when he suddenly collapsed as witnessed by his wife and was noted to not be breathing or have a pulse. Per wife he slumped in chair, did not fall and hit his head. Wife started CPR, EMS was contacted and upon arrival was assessed to be in VF. Was shocked twice, given three rounds of epi and had ROSC. Intubated in the field and arrived at the ED with pulse. Per family patient also recently admitted to [facility] for HF exacerbation, found to have PNA for which he was treated. Per wife, patient had checked his blood sugar prior to arrest and was 135. Patient's wife believes she called for EMS maybe 5-10 minutes after he went unresponsive. pt had been on midodrine and demadex after his recent hospitalization as per [facility] discharge papers family revealed.   In the ED, noted to have /73, HR 81, 100% saturation. Elevated WBC to 15.3, HAGMA with lactate of 10.5 on VBG, Pro-BNP of 5819. He had ETT changed and was then noted to not have lung sounds on the right side. Was found to have moderate sized pneumo on CXR, now s/p right-sided chest tube with reinflation of lung seen on CXR. Patient was sedated on fent and admitted to CICU for further management s/p VF arrest. pt with decrease urination renal consult called.  seen earlier     PAST HISTORY  --------------------------------------------------------------------------------  PAST MEDICAL & SURGICAL HISTORY:  Congestive heart failure (CHF)        FAMILY HISTORY:    PAST SOCIAL HISTORY:    ALLERGIES & MEDICATIONS  --------------------------------------------------------------------------------  Allergies    No Known Allergies    Intolerances      Standing Inpatient Medications  artificial  tears Solution 1 Drop(s) Both EYES every 6 hours  chlorhexidine 0.12% Liquid 15 milliLiter(s) Oral Mucosa every 12 hours  chlorhexidine 2% Cloths 1 Application(s) Topical at bedtime  dextrose 5%. 1000 milliLiter(s) IV Continuous <Continuous>  dextrose 5%. 1000 milliLiter(s) IV Continuous <Continuous>  dextrose 50% Injectable 25 Gram(s) IV Push once  dextrose 50% Injectable 12.5 Gram(s) IV Push once  dextrose 50% Injectable 25 Gram(s) IV Push once  glucagon  Injectable 1 milliGRAM(s) IntraMuscular once  heparin  Infusion 800 Unit(s)/Hr IV Continuous <Continuous>  insulin lispro (ADMELOG) corrective regimen sliding scale   SubCutaneous every 6 hours  insulin NPH human recombinant 3 Unit(s) SubCutaneous every 6 hours  lidocaine   Infusion 1 mG/Min IV Continuous <Continuous>  propofol Infusion 25 MICROgram(s)/kG/Min IV Continuous <Continuous>    PRN Inpatient Medications  dextrose Oral Gel 15 Gram(s) Oral once PRN      REVIEW OF SYSTEMS  --------------------------------------------------------------------------------  intubated sedated    VITALS/PHYSICAL EXAM  --------------------------------------------------------------------------------  T(C): 37.6 (02-04-23 @ 15:45), Max: 37.9 (02-04-23 @ 01:30)  HR: 99 (02-04-23 @ 16:00) (68 - 120)  BP: 110/69 (02-04-23 @ 07:15) (110/69 - 143/99)  RR: 20 (02-04-23 @ 16:00) (15 - 39)  SpO2: 100% (02-04-23 @ 16:00) (95% - 100%)  Wt(kg): --    Weight (kg): 65.9 (02-03-23 @ 23:00)      02-03-23 @ 07:01  -  02-04-23 @ 07:00  --------------------------------------------------------  IN: 1739.8 mL / OUT: 85 mL / NET: 1654.8 mL    02-04-23 @ 07:01  -  02-04-23 @ 16:33  --------------------------------------------------------  IN: 174.7 mL / OUT: 0 mL / NET: 174.7 mL      Physical Exam:  	Gen: intubated   	no jvd  	Pulm: decrease bs  no rales or ronchi or wheezing  	CV: RRR, S1S2; no rub  	Abd: + hypoactive zBS, soft, nondistended  	: No bladder distention   	UE: Warm, no cyanosis  no clubbing,  no edema  	LE: Warm, no cyanosis  no clubbing, no edema  	Neuro:  intubated     LABS/STUDIES  --------------------------------------------------------------------------------              11.9   17.15 >-----------<  284      [02-04-23 @ 16:09]              37.5     132  |  93  |  51  ----------------------------<  310      [02-04-23 @ 05:28]  4.2   |  20  |  2.86        Ca     9.0     [02-04-23 @ 05:28]      Mg     2.1     [02-04-23 @ 05:28]      Phos  4.8     [02-04-23 @ 05:28]    TPro  6.6  /  Alb  3.3  /  TBili  0.5  /  DBili  x   /  AST  169  /  ALT  135  /  AlkPhos  95  [02-04-23 @ 05:28]    PT/INR: PT 18.5 , INR 1.59       [02-04-23 @ 05:30]  PTT: 49.5       [02-04-23 @ 05:30]      Creatinine Trend:  SCr 2.86 [02-04 @ 05:28]  SCr 2.52 [02-04 @ 00:24]  SCr 1.84 [02-03 @ 20:19]    Urinalysis - [02-04-23 @ 07:22]      Color Light Yellow / Appearance Clear / SG 1.011 / pH 6.0      Gluc Negative / Ketone Negative  / Bili Negative / Urobili Negative       Blood Moderate / Protein 30 mg/dL / Leuk Est Negative / Nitrite Negative      RBC 21 / WBC 3 / Hyaline 1 / Gran  / Sq Epi  / Non Sq Epi 3 / Bacteria Negative      TSH 4.62      [02-04-23 @ 00:27]  Lipid: chol 116, , HDL 46, LDL --      [02-04-23 @ 00:24]      < from: Xray Chest 1 View- PORTABLE-Urgent (Xray Chest 1 View- PORTABLE-Urgent .) (02.04.23 @ 11:45) >    ACC: [id number] EXAM:  XR CHEST PORTABLE URGENT 1V   ORDERED BY: RODGER QUINTERO     ACC: [id number] EXAM:  XR CHEST PORTABLE URGENT 1V   ORDERED BY: ALVAREZ LUND     ACC: [id number] EXAM:  XR CHEST PORTABLE URGENT 1V   ORDERED BY:  IBETH MANCERA     PROCEDURE DATE:  02/04/2023          INTERPRETATION:  HISTORY: Nasogastric tube placement. Pneumothorax.    TECHNIQUE: Portable views the chest dated 2/4/2023 at 3:44 AM, 7:21 AM,   and 11:14 AM    COMPARISON: 2/3/2023    FINDINGS: On the final 2 images, there is a nasogastric tube with its tip   in good position, within the stomach. The cardiac silhouette is normal in   size. Left-sided dual-lead pacemaker. There is a right basilar chest tube   in place. There is an unchanged small right pneumothorax.      IMPRESSION: Unchanged small right pneumothorax. Nasogastric tube in good   position on the final 2 images.    --- End of Report ---    < end of copied text >

## 2023-02-05 VITALS — OXYGEN SATURATION: 68 %

## 2023-02-05 DIAGNOSIS — J90 PLEURAL EFFUSION, NOT ELSEWHERE CLASSIFIED: ICD-10-CM

## 2023-02-05 LAB
ALBUMIN SERPL ELPH-MCNC: 3.3 G/DL — SIGNIFICANT CHANGE UP (ref 3.3–5)
ALP SERPL-CCNC: 90 U/L — SIGNIFICANT CHANGE UP (ref 40–120)
ALT FLD-CCNC: 111 U/L — HIGH (ref 10–45)
ANION GAP SERPL CALC-SCNC: 20 MMOL/L — HIGH (ref 5–17)
APTT BLD: 41.4 SEC — HIGH (ref 27.5–35.5)
APTT BLD: 75.2 SEC — HIGH (ref 27.5–35.5)
AST SERPL-CCNC: 94 U/L — HIGH (ref 10–40)
BILIRUB SERPL-MCNC: 0.4 MG/DL — SIGNIFICANT CHANGE UP (ref 0.2–1.2)
BUN SERPL-MCNC: 64 MG/DL — HIGH (ref 7–23)
CALCIUM SERPL-MCNC: 9 MG/DL — SIGNIFICANT CHANGE UP (ref 8.4–10.5)
CHLORIDE SERPL-SCNC: 92 MMOL/L — LOW (ref 96–108)
CO2 SERPL-SCNC: 21 MMOL/L — LOW (ref 22–31)
CREAT SERPL-MCNC: 4.4 MG/DL — HIGH (ref 0.5–1.3)
EGFR: 12 ML/MIN/1.73M2 — LOW
GAS PNL BLDA: SIGNIFICANT CHANGE UP
GAS PNL BLDA: SIGNIFICANT CHANGE UP
GLUCOSE BLDC GLUCOMTR-MCNC: 198 MG/DL — HIGH (ref 70–99)
GLUCOSE SERPL-MCNC: 231 MG/DL — HIGH (ref 70–99)
HCT VFR BLD CALC: 38.8 % — LOW (ref 39–50)
HGB BLD-MCNC: 12.6 G/DL — LOW (ref 13–17)
INR BLD: 1.34 RATIO — HIGH (ref 0.88–1.16)
INR BLD: 1.49 RATIO — HIGH (ref 0.88–1.16)
MAGNESIUM SERPL-MCNC: 2.2 MG/DL — SIGNIFICANT CHANGE UP (ref 1.6–2.6)
MCHC RBC-ENTMCNC: 30.2 PG — SIGNIFICANT CHANGE UP (ref 27–34)
MCHC RBC-ENTMCNC: 32.5 GM/DL — SIGNIFICANT CHANGE UP (ref 32–36)
MCV RBC AUTO: 93 FL — SIGNIFICANT CHANGE UP (ref 80–100)
NRBC # BLD: 0 /100 WBCS — SIGNIFICANT CHANGE UP (ref 0–0)
PHOSPHATE SERPL-MCNC: 6.9 MG/DL — HIGH (ref 2.5–4.5)
PLATELET # BLD AUTO: 311 K/UL — SIGNIFICANT CHANGE UP (ref 150–400)
POTASSIUM SERPL-MCNC: 4.5 MMOL/L — SIGNIFICANT CHANGE UP (ref 3.5–5.3)
POTASSIUM SERPL-SCNC: 4.5 MMOL/L — SIGNIFICANT CHANGE UP (ref 3.5–5.3)
PROT SERPL-MCNC: 6.8 G/DL — SIGNIFICANT CHANGE UP (ref 6–8.3)
PROTHROM AB SERPL-ACNC: 15.4 SEC — HIGH (ref 10.5–13.4)
PROTHROM AB SERPL-ACNC: 17.2 SEC — HIGH (ref 10.5–13.4)
RBC # BLD: 4.17 M/UL — LOW (ref 4.2–5.8)
RBC # FLD: 16.4 % — HIGH (ref 10.3–14.5)
SODIUM SERPL-SCNC: 133 MMOL/L — LOW (ref 135–145)
WBC # BLD: 17.78 K/UL — HIGH (ref 3.8–10.5)
WBC # FLD AUTO: 17.78 K/UL — HIGH (ref 3.8–10.5)

## 2023-02-05 PROCEDURE — 95816 EEG AWAKE AND DROWSY: CPT

## 2023-02-05 PROCEDURE — 85730 THROMBOPLASTIN TIME PARTIAL: CPT

## 2023-02-05 PROCEDURE — 71045 X-RAY EXAM CHEST 1 VIEW: CPT | Mod: 26

## 2023-02-05 PROCEDURE — 86901 BLOOD TYPING SEROLOGIC RH(D): CPT

## 2023-02-05 PROCEDURE — 84295 ASSAY OF SERUM SODIUM: CPT

## 2023-02-05 PROCEDURE — 99292 CRITICAL CARE ADDL 30 MIN: CPT

## 2023-02-05 PROCEDURE — 85014 HEMATOCRIT: CPT

## 2023-02-05 PROCEDURE — 81001 URINALYSIS AUTO W/SCOPE: CPT

## 2023-02-05 PROCEDURE — 0225U NFCT DS DNA&RNA 21 SARSCOV2: CPT

## 2023-02-05 PROCEDURE — 99232 SBSQ HOSP IP/OBS MODERATE 35: CPT

## 2023-02-05 PROCEDURE — 84100 ASSAY OF PHOSPHORUS: CPT

## 2023-02-05 PROCEDURE — 85610 PROTHROMBIN TIME: CPT

## 2023-02-05 PROCEDURE — 87040 BLOOD CULTURE FOR BACTERIA: CPT

## 2023-02-05 PROCEDURE — 93005 ELECTROCARDIOGRAM TRACING: CPT

## 2023-02-05 PROCEDURE — 82947 ASSAY GLUCOSE BLOOD QUANT: CPT

## 2023-02-05 PROCEDURE — 70450 CT HEAD/BRAIN W/O DYE: CPT

## 2023-02-05 PROCEDURE — 82435 ASSAY OF BLOOD CHLORIDE: CPT

## 2023-02-05 PROCEDURE — 85027 COMPLETE CBC AUTOMATED: CPT

## 2023-02-05 PROCEDURE — 85520 HEPARIN ASSAY: CPT

## 2023-02-05 PROCEDURE — 82803 BLOOD GASES ANY COMBINATION: CPT

## 2023-02-05 PROCEDURE — 82330 ASSAY OF CALCIUM: CPT

## 2023-02-05 PROCEDURE — 71045 X-RAY EXAM CHEST 1 VIEW: CPT

## 2023-02-05 PROCEDURE — 83735 ASSAY OF MAGNESIUM: CPT

## 2023-02-05 PROCEDURE — 82962 GLUCOSE BLOOD TEST: CPT

## 2023-02-05 PROCEDURE — C8929: CPT

## 2023-02-05 PROCEDURE — 82553 CREATINE MB FRACTION: CPT

## 2023-02-05 PROCEDURE — 80053 COMPREHEN METABOLIC PANEL: CPT

## 2023-02-05 PROCEDURE — 99231 SBSQ HOSP IP/OBS SF/LOW 25: CPT | Mod: FS

## 2023-02-05 PROCEDURE — 86850 RBC ANTIBODY SCREEN: CPT

## 2023-02-05 PROCEDURE — 99285 EMERGENCY DEPT VISIT HI MDM: CPT | Mod: 25

## 2023-02-05 PROCEDURE — 84132 ASSAY OF SERUM POTASSIUM: CPT

## 2023-02-05 PROCEDURE — 74018 RADEX ABDOMEN 1 VIEW: CPT

## 2023-02-05 PROCEDURE — 36415 COLL VENOUS BLD VENIPUNCTURE: CPT

## 2023-02-05 PROCEDURE — 94003 VENT MGMT INPAT SUBQ DAY: CPT

## 2023-02-05 PROCEDURE — 83880 ASSAY OF NATRIURETIC PEPTIDE: CPT

## 2023-02-05 PROCEDURE — 82565 ASSAY OF CREATININE: CPT

## 2023-02-05 PROCEDURE — 86900 BLOOD TYPING SEROLOGIC ABO: CPT

## 2023-02-05 PROCEDURE — 84484 ASSAY OF TROPONIN QUANT: CPT

## 2023-02-05 PROCEDURE — 85025 COMPLETE CBC W/AUTO DIFF WBC: CPT

## 2023-02-05 PROCEDURE — 83605 ASSAY OF LACTIC ACID: CPT

## 2023-02-05 PROCEDURE — 80061 LIPID PANEL: CPT

## 2023-02-05 PROCEDURE — 96374 THER/PROPH/DIAG INJ IV PUSH: CPT

## 2023-02-05 PROCEDURE — 93010 ELECTROCARDIOGRAM REPORT: CPT

## 2023-02-05 PROCEDURE — 95816 EEG AWAKE AND DROWSY: CPT | Mod: 26

## 2023-02-05 PROCEDURE — 84443 ASSAY THYROID STIM HORMONE: CPT

## 2023-02-05 PROCEDURE — 85018 HEMOGLOBIN: CPT

## 2023-02-05 PROCEDURE — 99291 CRITICAL CARE FIRST HOUR: CPT

## 2023-02-05 PROCEDURE — 83036 HEMOGLOBIN GLYCOSYLATED A1C: CPT

## 2023-02-05 RX ORDER — SCOPALAMINE 1 MG/3D
1 PATCH, EXTENDED RELEASE TRANSDERMAL
Refills: 0 | Status: DISCONTINUED | OUTPATIENT
Start: 2023-02-05 | End: 2023-02-05

## 2023-02-05 RX ORDER — SEVELAMER CARBONATE 2400 MG/1
800 POWDER, FOR SUSPENSION ORAL
Refills: 0 | Status: DISCONTINUED | OUTPATIENT
Start: 2023-02-05 | End: 2023-02-05

## 2023-02-05 RX ORDER — SODIUM CHLORIDE 9 MG/ML
250 INJECTION, SOLUTION INTRAVENOUS ONCE
Refills: 0 | Status: COMPLETED | OUTPATIENT
Start: 2023-02-05 | End: 2023-02-05

## 2023-02-05 RX ORDER — SODIUM CHLORIDE 9 MG/ML
500 INJECTION, SOLUTION INTRAVENOUS
Refills: 0 | Status: DISCONTINUED | OUTPATIENT
Start: 2023-02-05 | End: 2023-02-05

## 2023-02-05 RX ORDER — HYDROMORPHONE HYDROCHLORIDE 2 MG/ML
1 INJECTION INTRAMUSCULAR; INTRAVENOUS; SUBCUTANEOUS ONCE
Refills: 0 | Status: DISCONTINUED | OUTPATIENT
Start: 2023-02-05 | End: 2023-02-05

## 2023-02-05 RX ORDER — HUMAN INSULIN 100 [IU]/ML
6 INJECTION, SUSPENSION SUBCUTANEOUS EVERY 6 HOURS
Refills: 0 | Status: DISCONTINUED | OUTPATIENT
Start: 2023-02-05 | End: 2023-02-05

## 2023-02-05 RX ORDER — MIDAZOLAM HYDROCHLORIDE 1 MG/ML
2 INJECTION, SOLUTION INTRAMUSCULAR; INTRAVENOUS ONCE
Refills: 0 | Status: DISCONTINUED | OUTPATIENT
Start: 2023-02-05 | End: 2023-02-05

## 2023-02-05 RX ORDER — SEVELAMER CARBONATE 2400 MG/1
800 POWDER, FOR SUSPENSION ORAL THREE TIMES A DAY
Refills: 0 | Status: DISCONTINUED | OUTPATIENT
Start: 2023-02-05 | End: 2023-02-05

## 2023-02-05 RX ORDER — ASPIRIN/CALCIUM CARB/MAGNESIUM 324 MG
81 TABLET ORAL DAILY
Refills: 0 | Status: DISCONTINUED | OUTPATIENT
Start: 2023-02-05 | End: 2023-02-05

## 2023-02-05 RX ORDER — MORPHINE SULFATE 50 MG/1
1 CAPSULE, EXTENDED RELEASE ORAL
Qty: 100 | Refills: 0 | Status: DISCONTINUED | OUTPATIENT
Start: 2023-02-05 | End: 2023-02-05

## 2023-02-05 RX ORDER — METOPROLOL TARTRATE 50 MG
12.5 TABLET ORAL DAILY
Refills: 0 | Status: DISCONTINUED | OUTPATIENT
Start: 2023-02-05 | End: 2023-02-05

## 2023-02-05 RX ADMIN — MORPHINE SULFATE 1 MG/HR: 50 CAPSULE, EXTENDED RELEASE ORAL at 13:24

## 2023-02-05 RX ADMIN — HYDROMORPHONE HYDROCHLORIDE 1 MILLIGRAM(S): 2 INJECTION INTRAMUSCULAR; INTRAVENOUS; SUBCUTANEOUS at 17:59

## 2023-02-05 RX ADMIN — Medication 4: at 00:55

## 2023-02-05 RX ADMIN — MIDAZOLAM HYDROCHLORIDE 2 MILLIGRAM(S): 1 INJECTION, SOLUTION INTRAMUSCULAR; INTRAVENOUS at 16:42

## 2023-02-05 RX ADMIN — HUMAN INSULIN 3 UNIT(S): 100 INJECTION, SUSPENSION SUBCUTANEOUS at 00:55

## 2023-02-05 RX ADMIN — SODIUM CHLORIDE 250 MILLILITER(S): 9 INJECTION, SOLUTION INTRAVENOUS at 12:00

## 2023-02-05 RX ADMIN — Medication 1 DROP(S): at 06:04

## 2023-02-05 RX ADMIN — Medication 1 DROP(S): at 00:55

## 2023-02-05 RX ADMIN — Medication 7.5 MG/MIN: at 08:50

## 2023-02-05 RX ADMIN — HUMAN INSULIN 3 UNIT(S): 100 INJECTION, SUSPENSION SUBCUTANEOUS at 06:05

## 2023-02-05 RX ADMIN — CHLORHEXIDINE GLUCONATE 15 MILLILITER(S): 213 SOLUTION TOPICAL at 06:04

## 2023-02-05 RX ADMIN — SCOPALAMINE 1 PATCH: 1 PATCH, EXTENDED RELEASE TRANSDERMAL at 17:21

## 2023-02-05 RX ADMIN — SEVELAMER CARBONATE 800 MILLIGRAM(S): 2400 POWDER, FOR SUSPENSION ORAL at 09:43

## 2023-02-05 RX ADMIN — Medication 1 DROP(S): at 13:23

## 2023-02-05 RX ADMIN — SODIUM CHLORIDE 250 MILLILITER(S): 9 INJECTION, SOLUTION INTRAVENOUS at 11:52

## 2023-02-05 RX ADMIN — Medication 2: at 06:05

## 2023-02-05 RX ADMIN — HEPARIN SODIUM 12 UNIT(S)/HR: 5000 INJECTION INTRAVENOUS; SUBCUTANEOUS at 05:12

## 2023-02-05 RX ADMIN — Medication 0.5 MILLIGRAM(S): at 17:21

## 2023-02-05 NOTE — PROGRESS NOTE ADULT - SUBJECTIVE AND OBJECTIVE BOX
EPS Progress Note    S: Patient made DNR over night.       T(C): 37.2 (02-05-23 @ 08:00), Max: 37.6 (02-04-23 @ 12:00)  HR: 101 (02-05-23 @ 10:00) (88 - 118)  BP: --  RR: 29 (02-05-23 @ 10:00) (20 - 39)  SpO2: 99% (02-05-23 @ 10:00) (97% - 100%)  Wt(kg): --     Telemetry: V paced          General:  Intubated and sedated    Chest:  Chest is clear to auscultation bilaterally without wheezes, crackles, or rhonchi, chest tube present  Cardiac: Tachy,  No murmur, rubs, or gallops heard.  Abdomen:  Soft without rebound or guarding.  Bowel sounds are presnt in all 4 quadrants.  No hepatosplenomegaly  Extremities:  No lower extremity edema is present.  No cyanosis or clubbing       MEDICATIONS  (STANDING):  artificial  tears Solution 1 Drop(s) Both EYES every 6 hours  chlorhexidine 0.12% Liquid 15 milliLiter(s) Oral Mucosa every 12 hours  chlorhexidine 2% Cloths 1 Application(s) Topical at bedtime  dextrose 5%. 1000 milliLiter(s) (50 mL/Hr) IV Continuous <Continuous>  dextrose 5%. 1000 milliLiter(s) (100 mL/Hr) IV Continuous <Continuous>  dextrose 50% Injectable 25 Gram(s) IV Push once  dextrose 50% Injectable 12.5 Gram(s) IV Push once  dextrose 50% Injectable 25 Gram(s) IV Push once  glucagon  Injectable 1 milliGRAM(s) IntraMuscular once  heparin  Infusion 800 Unit(s)/Hr (12 mL/Hr) IV Continuous <Continuous>  insulin lispro (ADMELOG) corrective regimen sliding scale   SubCutaneous every 6 hours  insulin NPH human recombinant 3 Unit(s) SubCutaneous every 6 hours  lactated ringers. 500 milliLiter(s) (250 mL/Hr) IV Continuous <Continuous>  lidocaine   Infusion 1 mG/Min (7.5 mL/Hr) IV Continuous <Continuous>  propofol Infusion 25 MICROgram(s)/kG/Min (9.89 mL/Hr) IV Continuous <Continuous>  sevelamer carbonate Powder 800 milliGRAM(s) Enteral Tube three times a day with meals    MEDICATIONS  (PRN):  dextrose Oral Gel 15 Gram(s) Oral once PRN Blood Glucose LESS THAN 70 milliGRAM(s)/deciliter                                                         12.6   17.78 )-----------( 311      ( 05 Feb 2023 03:07 )             38.8     02-05    133<L>  |  92<L>  |  64<H>  ----------------------------<  231<H>  4.5   |  21<L>  |  4.40<H>    Ca    9.0      05 Feb 2023 03:06  Phos  6.9     02-05  Mg     2.2     02-05    TPro  6.8  /  Alb  3.3  /  TBili  0.4  /  DBili  x   /  AST  94<H>  /  ALT  111<H>  /  AlkPhos  90  02-05    PT/INR - ( 05 Feb 2023 03:07 )   PT: 15.4 sec;   INR: 1.34 ratio         PTT - ( 05 Feb 2023 03:07 )  PTT:41.4 sec

## 2023-02-05 NOTE — DISCHARGE NOTE FOR THE EXPIRED PATIENT - HOSPITAL COURSE
87 year old man with history of HFrEF last EF 40-45%, Afib/Aflutter s/p ablation, high grade AV block s/p PPM, T2DM, CKD brought in by EMS following cardiac arrest at home. Found to have VF s/p defib x 2, epi x 3 with ROSC and with course complicated by right-sided pneumothorax s/p chest tube. Patient admitted to CICU for further management post VF arrest.    Pt off sedation had no meaningful neurological recovery and concern for myoclonic jerks. EEG performed showed diffuse attenuation with abundant GPDs, initially with spike/polyspike morphology, then more triphasic. Per EEG attending, concerning for poor prognosis. Pt also with rising Cr and anuric, family opted to decline CRRT given concerning neurological status.     On 2/5 AM family opted to make pt comfort measures only and pt was palliatively extubated during day shift. Around 8pm noted pt increasingly bradycardic with quickly dropping MAP. Went to evaluate patient at bedside, noted asystole on monitor with flat arterial line. Pt with no palpable pulses and no respirations.     Auscultated pt cardiac sounds for 10 seconds, respiratory sounds for 10 seconds, and carotid sounds for 10 seconds, all absent.     Pt time of death 20:08. Family at bedside. Rn at bedside.     Dr Richards in unit notified.

## 2023-02-05 NOTE — EEG REPORT - NS EEG TEXT BOX
SUNITA LINDSAY [id number]     Study Date: 		02-05-23  Duration x Hours:   --------------------------------------------------------------------------------------------------  History:  CC/ HPI Patient is a 87y old  Male who presents with a chief complaint of VFib (05 Feb 2023 06:57)    MEDICATIONS  (STANDING):  artificial  tears Solution 1 Drop(s) Both EYES every 6 hours  chlorhexidine 0.12% Liquid 15 milliLiter(s) Oral Mucosa every 12 hours  chlorhexidine 2% Cloths 1 Application(s) Topical at bedtime  dextrose 5%. 1000 milliLiter(s) (50 mL/Hr) IV Continuous <Continuous>  dextrose 5%. 1000 milliLiter(s) (100 mL/Hr) IV Continuous <Continuous>  dextrose 50% Injectable 25 Gram(s) IV Push once  dextrose 50% Injectable 12.5 Gram(s) IV Push once  dextrose 50% Injectable 25 Gram(s) IV Push once  glucagon  Injectable 1 milliGRAM(s) IntraMuscular once  heparin  Infusion 800 Unit(s)/Hr (12 mL/Hr) IV Continuous <Continuous>  insulin lispro (ADMELOG) corrective regimen sliding scale   SubCutaneous every 6 hours  insulin NPH human recombinant 3 Unit(s) SubCutaneous every 6 hours  lactated ringers. 500 milliLiter(s) (250 mL/Hr) IV Continuous <Continuous>  lidocaine   Infusion 1 mG/Min (7.5 mL/Hr) IV Continuous <Continuous>  propofol Infusion 25 MICROgram(s)/kG/Min (9.89 mL/Hr) IV Continuous <Continuous>  sevelamer carbonate Powder 800 milliGRAM(s) Enteral Tube three times a day with meals    --------------------------------------------------------------------------------------------------  Study Interpretation:    [[[Abbreviation Key:  PDR=alpha rhythm/posterior dominant rhythm. A-P=anterior posterior.  Amplitude: ‘very low’:<20; ‘low’:20-49; ‘medium’:; ‘high’:>150uV.  Persistence for periodic/rhythmic patterns (% of epoch) ‘rare’:<1%; ‘occasional’:1-10%; ‘frequent’:10-50%; ‘abundant’:50-90%; ‘continuous’:>90%.  Persistence for sporadic discharges: ‘rare’:<1/hr; ‘occasional’:1/min-1/hr; ‘frequent’:>1/min; ‘abundant’:>1/10 sec.  RPP=rhythmic and periodic patterns; GRDA=generalized rhythmic delta activity; FIRDA=frontal intermittent GRDA; LRDA=lateralized rhythmic delta activity; TIRDA=temporal intermittent rhythmic delta activity;  LPD=PLED=lateralized periodic discharges; GPD=generalized periodic discharges; BIPDs =bilateral independent periodic discharges; Mf=multifocal; SIRPDs=stimulus induced rhythmic, periodic, or ictal appearing discharges; BIRDs=brief potentially ictal rhythmic discharges >4 Hz, lasting .5-10s; PFA (paroxysmal bursts >13 Hz or =8 Hz <10s).  Modifiers: +F=with fast component; +S=with spike component; +R=with rhythmic component.  S-B=burst suppression pattern.  Max=maximal. N1-drowsy; N2-stage II sleep; N3-slow wave sleep. SSS/BETS=small sharp spikes/benign epileptiform transients of sleep. HV=hyperventilation; PS=photic stimulation]]]    Daily EEG Visual Analysis    FINDINGS:      Background: Background is diffusely attenuated with GPDs 1hz. Early in the recording, the GPDs had spike wave and polyspike wave morphology. After 1700, the GPDs were largely triphasic in morphology. No clear clinical association.       State Changes:   No clear state change.     Electrographic and Electroclinical seizures:  None    Other Clinical Events:  None    Activation Procedures:   -Hyperventilation was not performed.    -Photic stimulation was not performed.    Artifacts:  None    ECG:  The heart rate on single channel ECG was predominantly between 100-120 BPM.    EEG Classification / Summary:  Abnormal  EEG in the awake / drowsy / asleep state(s).    Diffuse attenuation with abundant GPDs, initially with spike/polyspike morphology, then more triphasic    Clinical Impression:  Suppressed background with GPDs post cardiac arrest typically portends poor prognosis. Primary team notified.     This is a prelim report only, pending review with attending prior to finalization.        -------------------------------------------------------------------------------------------------------  [facility] EEG Reading Room Ph#: [phone]  Epilepsy Answering Service after 5PM and before 8:30AM: Ph#: [phone]    Chris Anderson M.D.   Epilepsy fellow SUNITA LINDSAY [id number]     Study Date: 		02-05-23  Duration x Hours:   --------------------------------------------------------------------------------------------------  History:  CC/ HPI Patient is a 87y old  Male who presents with a chief complaint of VFib (05 Feb 2023 06:57)    MEDICATIONS  (STANDING):  artificial  tears Solution 1 Drop(s) Both EYES every 6 hours  chlorhexidine 0.12% Liquid 15 milliLiter(s) Oral Mucosa every 12 hours  chlorhexidine 2% Cloths 1 Application(s) Topical at bedtime  dextrose 5%. 1000 milliLiter(s) (50 mL/Hr) IV Continuous <Continuous>  dextrose 5%. 1000 milliLiter(s) (100 mL/Hr) IV Continuous <Continuous>  dextrose 50% Injectable 25 Gram(s) IV Push once  dextrose 50% Injectable 12.5 Gram(s) IV Push once  dextrose 50% Injectable 25 Gram(s) IV Push once  glucagon  Injectable 1 milliGRAM(s) IntraMuscular once  heparin  Infusion 800 Unit(s)/Hr (12 mL/Hr) IV Continuous <Continuous>  insulin lispro (ADMELOG) corrective regimen sliding scale   SubCutaneous every 6 hours  insulin NPH human recombinant 3 Unit(s) SubCutaneous every 6 hours  lactated ringers. 500 milliLiter(s) (250 mL/Hr) IV Continuous <Continuous>  lidocaine   Infusion 1 mG/Min (7.5 mL/Hr) IV Continuous <Continuous>  propofol Infusion 25 MICROgram(s)/kG/Min (9.89 mL/Hr) IV Continuous <Continuous>  sevelamer carbonate Powder 800 milliGRAM(s) Enteral Tube three times a day with meals    --------------------------------------------------------------------------------------------------  Study Interpretation:    [[[Abbreviation Key:  PDR=alpha rhythm/posterior dominant rhythm. A-P=anterior posterior.  Amplitude: ‘very low’:<20; ‘low’:20-49; ‘medium’:; ‘high’:>150uV.  Persistence for periodic/rhythmic patterns (% of epoch) ‘rare’:<1%; ‘occasional’:1-10%; ‘frequent’:10-50%; ‘abundant’:50-90%; ‘continuous’:>90%.  Persistence for sporadic discharges: ‘rare’:<1/hr; ‘occasional’:1/min-1/hr; ‘frequent’:>1/min; ‘abundant’:>1/10 sec.  RPP=rhythmic and periodic patterns; GRDA=generalized rhythmic delta activity; FIRDA=frontal intermittent GRDA; LRDA=lateralized rhythmic delta activity; TIRDA=temporal intermittent rhythmic delta activity;  LPD=PLED=lateralized periodic discharges; GPD=generalized periodic discharges; BIPDs =bilateral independent periodic discharges; Mf=multifocal; SIRPDs=stimulus induced rhythmic, periodic, or ictal appearing discharges; BIRDs=brief potentially ictal rhythmic discharges >4 Hz, lasting .5-10s; PFA (paroxysmal bursts >13 Hz or =8 Hz <10s).  Modifiers: +F=with fast component; +S=with spike component; +R=with rhythmic component.  S-B=burst suppression pattern.  Max=maximal. N1-drowsy; N2-stage II sleep; N3-slow wave sleep. SSS/BETS=small sharp spikes/benign epileptiform transients of sleep. HV=hyperventilation; PS=photic stimulation]]]    Daily EEG Visual Analysis    FINDINGS:      Background: Background is diffusely attenuated with GPDs 1hz. Early in the recording, the GPDs had spike wave and polyspike wave morphology. After 1700, the GPDs were largely triphasic in morphology. No clear clinical association.       State Changes:   No clear state change.     Electrographic and Electroclinical seizures:  None    Other Clinical Events:  None    Activation Procedures:   -Hyperventilation was not performed.    -Photic stimulation was not performed.    Artifacts:  None    ECG:  The heart rate on single channel ECG was predominantly between 100-120 BPM.    EEG Classification / Summary:  Abnormal  EEG in the awake / drowsy / asleep state(s).    Diffuse attenuation with abundant GPDs, initially with spike/polyspike morphology, then more triphasic    Clinical Impression:  Suppressed background with GPDs post cardiac arrest typically portends poor prognosis. Primary team notified.       -------------------------------------------------------------------------------------------------------  [facility] EEG Reading Room Ph#: [phone]  Epilepsy Answering Service after 5PM and before 8:30AM: Ph#: [phone]    Chris Anderson M.D.   Epilepsy fellow

## 2023-02-05 NOTE — AIRWAY REMOVAL NOTE  ADULT & PEDS - ARTIFICAL AIRWAY REMOVAL COMMENTS
Written order for extubation verified. The patient was identified by full name and birth date compared to the identification band. Present during the procedure was Jeffry

## 2023-02-05 NOTE — CHART NOTE - NSCHARTNOTEFT_GEN_A_CORE
Noted pt bradycardic with slowing HR to 40s and noted MAP in 20s around 20:05. Entered room to evaluate patient and pt became asystolic and MAP zero with no palpable pulses. Family and RN at bedside.     Auscultated pt cardiac sounds for 10 seconds, respiratory sounds for 10 seconds, and carotid sounds for 10 seconds, all absent.     Pt time of death 20:08.     Dottie Live PA-C

## 2023-02-05 NOTE — PROGRESS NOTE ADULT - ASSESSMENT
This is an 87 year old man presents with V fib arrest, ETT c/b moderate right side ptx, chest tube placement by ED which has migrated to be extrapleural, with residual ptx on right side.   2/4  Thoracic surgery will replace pigtail thoracostomy tube on right side  - Hold heparin gtt x4 hours, check coags after held   2/5 VSS intubated  sedated seen and examined in company of Dr Pérez intermittent/small  air leak noted in pleuravac maintain LWS daily xray. care per CICU  - Case Discussed with Attending Thoracic Surgeon Dr. EDMAR Pérez

## 2023-02-05 NOTE — PROGRESS NOTE ADULT - SUBJECTIVE AND OBJECTIVE BOX
Subjective ; GOSIA intubated      Vital Signs Last 24 Hrs  T(C): 37.2 (02-05-23 @ 08:00), Max: 37.6 (02-04-23 @ 12:00)  T(F): 98.9 (02-05-23 @ 08:00), Max: 99.7 (02-04-23 @ 12:00)  HR: 101 (02-05-23 @ 10:00) (88 - 118)  BP: --  RR: 29 (02-05-23 @ 10:00) (20 - 39)  SpO2: 99% (02-05-23 @ 10:00) (97% - 100%)             02-04 @ 07:01  -  02-05 @ 07:00  --------------------------------------------------------  IN: 813 mL / OUT: 47 mL / NET: 766 mL    02-05 @ 07:01  -  02-05 @ 11:34  --------------------------------------------------------  IN: 398.5 mL / OUT: 0 mL / NET: 398.5 mL                            12.6   17.78 )-----------( 311      ( 05 Feb 2023 03:07 )             38.8       02-05    133<L>  |  92<L>  |  64<H>  ----------------------------<  231<H>  4.5   |  21<L>  |  4.40<H>    Ca    9.0      05 Feb 2023 03:06  Phos  6.9     02-05  Mg     2.2     02-05    TPro  6.8  /  Alb  3.3  /  TBili  0.4  /  DBili  x   /  AST  94<H>  /  ALT  111<H>  /  AlkPhos  90  02-05    Mode: AC/ CMV (Assist Control/ Continuous Mandatory Ventilation)  RR (machine): 14  TV (machine): 400  FiO2: 30  PEEP: 5  ITime: 1  MAP: 11  PIP: 24    MEDICATIONS  (STANDING):  artificial  tears Solution 1 Drop(s) Both EYES every 6 hours  chlorhexidine 0.12% Liquid 15 milliLiter(s) Oral Mucosa every 12 hours  chlorhexidine 2% Cloths 1 Application(s) Topical at bedtime  glucagon  Injectable 1 milliGRAM(s) IntraMuscular once  heparin  Infusion 800 Unit(s)/Hr (12 mL/Hr) IV Continuous <Continuous>  insulin lispro (ADMELOG) corrective regimen sliding scale   SubCutaneous every 6 hours  insulin NPH human recombinant 3 Unit(s) SubCutaneous every 6 hours  lactated ringers. 500 milliLiter(s) (250 mL/Hr) IV Continuous <Continuous>  lidocaine   Infusion 1 mG/Min (7.5 mL/Hr) IV Continuous <Continuous>  propofol Infusion 25 MICROgram(s)/kG/Min (9.89 mL/Hr) IV Continuous <Continuous>  sevelamer carbonate Powder 800 milliGRAM(s) Enteral Tube three times a day with meals    MEDICATIONS  (PRN):  dextrose Oral Gel 15 Gram(s) Oral once PRN Blood Glucose LESS THAN 70 milliGRAM(s)/deciliter      CAPILLARY BLOOD GLUCOSE      POCT Blood Glucose.: 198 mg/dL (05 Feb 2023 06:03)  POCT Blood Glucose.: 204 mg/dL (04 Feb 2023 23:57)  POCT Blood Glucose.: 228 mg/dL (04 Feb 2023 17:48)  POCT Blood Glucose.: 273 mg/dL (04 Feb 2023 11:42)          Drains:                   R Pleural  [ x ]  Drainage: 0/20 LWS                              PHYSICAL EXAM        Neurology:  intubated  sedated  GOSIA    CV : S1  Lungs: B/l mechanical breath sounds  right pleural chest tube + air leak maintain suction  Abdomen: soft, nontender, nondistended, positive bowel sounds, last bowel movement   :  fischer            Extremities:   warm well perfused                                         Physical Therapy Rec:   Home  [  ]   Home w/ PT  [  ]  Rehab  [  ]    Discussed with Cardiothoracic Team at AM rounds.

## 2023-02-05 NOTE — PROGRESS NOTE ADULT - SUBJECTIVE AND OBJECTIVE BOX
[facility]   [phone]  RENAL FOLLOW UP NOTE  --------------------------------------------------------------------------------  Chief Complaint:    24 hour events/subjective:    seen earlier . intubated     PAST HISTORY  --------------------------------------------------------------------------------  No significant changes to PMH, PSH, FHx, SHx, unless otherwise noted    ALLERGIES & MEDICATIONS  --------------------------------------------------------------------------------  Allergies    No Known Allergies    Intolerances      Standing Inpatient Medications  artificial  tears Solution 1 Drop(s) Both EYES every 6 hours  aspirin  chewable 81 milliGRAM(s) Oral daily  chlorhexidine 0.12% Liquid 15 milliLiter(s) Oral Mucosa every 12 hours  chlorhexidine 2% Cloths 1 Application(s) Topical at bedtime  dextrose 5%. 1000 milliLiter(s) IV Continuous <Continuous>  dextrose 5%. 1000 milliLiter(s) IV Continuous <Continuous>  dextrose 50% Injectable 25 Gram(s) IV Push once  dextrose 50% Injectable 12.5 Gram(s) IV Push once  dextrose 50% Injectable 25 Gram(s) IV Push once  glucagon  Injectable 1 milliGRAM(s) IntraMuscular once  heparin  Infusion 800 Unit(s)/Hr IV Continuous <Continuous>  insulin lispro (ADMELOG) corrective regimen sliding scale   SubCutaneous every 6 hours  insulin NPH human recombinant 6 Unit(s) SubCutaneous every 6 hours  lactated ringers Bolus 250 milliLiter(s) IV Bolus once  lidocaine   Infusion 1 mG/Min IV Continuous <Continuous>  morphine  Infusion 1 mG/Hr IV Continuous <Continuous>  propofol Infusion 25 MICROgram(s)/kG/Min IV Continuous <Continuous>  sevelamer carbonate Powder 800 milliGRAM(s) Enteral Tube three times a day with meals    PRN Inpatient Medications  dextrose Oral Gel 15 Gram(s) Oral once PRN      REVIEW OF SYSTEMS  --------------------------------------------------------------------------------    Gen: denies  fevers/chills,  CVS: denies chest pain/palpitations  Resp: denies SOB/Cough  GI: Denies N/V/Abd pain  : Denies dysuria/oliguria/hematuria    All other systems were reviewed and are negative, except as noted.    VITALS/PHYSICAL EXAM  --------------------------------------------------------------------------------  T(C): 37.2 (02-05-23 @ 08:00), Max: 37.6 (02-04-23 @ 15:45)  HR: 100 (02-05-23 @ 12:00) (88 - 118)  BP: --  RR: 30 (02-05-23 @ 12:00) (20 - 39)  SpO2: 99% (02-05-23 @ 12:00) (97% - 100%)  Wt(kg): --    Weight (kg): 65.9 (02-03-23 @ 23:00)      02-04-23 @ 07:01  -  02-05-23 @ 07:00  --------------------------------------------------------  IN: 813 mL / OUT: 47 mL / NET: 766 mL    02-05-23 @ 07:01  -  02-05-23 @ 12:35  --------------------------------------------------------  IN: 698 mL / OUT: 5 mL / NET: 693 mL      Physical Exam:  	    	Gen: intubated   	no jvd  	Pulm: decrease bs  no rales or ronchi or wheezing chest tube   	CV: RRR, S1S2; no rub  	Abd: + hypoactive zBS, soft, nondistended  	: No bladder distention   	UE: Warm, no cyanosis  no clubbing,  no edema  	LE: Warm, no cyanosis  no clubbing, no edema        LABS/STUDIES  --------------------------------------------------------------------------------              12.6   17.78 >-----------<  311      [02-05-23 @ 03:07]              38.8     133  |  92  |  64  ----------------------------<  231      [02-05-23 @ 03:06]  4.5   |  21  |  4.40        Ca     9.0     [02-05-23 @ 03:06]      Mg     2.2     [02-05-23 @ 03:06]      Phos  6.9     [02-05-23 @ 03:06]    TPro  6.8  /  Alb  3.3  /  TBili  0.4  /  DBili  x   /  AST  94  /  ALT  111  /  AlkPhos  90  [02-05-23 @ 03:06]    PT/INR: PT 15.4 , INR 1.34       [02-05-23 @ 03:07]  PTT: 41.4       [02-05-23 @ 03:07]      Creatinine Trend:  SCr 4.40 [02-05 @ 03:06]  SCr 3.62 [02-04 @ 16:09]  SCr 2.86 [02-04 @ 05:28]  SCr 2.52 [02-04 @ 00:24]  SCr 1.84 [02-03 @ 20:19]              Urinalysis - [02-04-23 @ 07:22]      Color Light Yellow / Appearance Clear / SG 1.011 / pH 6.0      Gluc Negative / Ketone Negative  / Bili Negative / Urobili Negative       Blood Moderate / Protein 30 mg/dL / Leuk Est Negative / Nitrite Negative      RBC 21 / WBC 3 / Hyaline 1 / Gran  / Sq Epi  / Non Sq Epi 3 / Bacteria Negative      TSH 4.62      [02-04-23 @ 00:27]  Lipid: chol 116, , HDL 46, LDL --      [02-04-23 @ 00:24]

## 2023-02-05 NOTE — PROGRESS NOTE ADULT - ASSESSMENT
87 year old man with history of HFrEF last EF 40-45%, Afib/Aflutter s/p ablation, high grade AV block s/p PPM, T2DM, CKD brought in by EMS following cardiac arrest at home.  EMS was contacted and upon arrival was assessed to be in VF. Was shocked twice, given three rounds of epi and had ROSC. Intubated in the field and arrived at the ED with pulse.  recently admitted to [facility] for HF exacerbation, found to have PNA for which he was treated. pt had been on midodrine and demadex after his recent hospitalization as per [facility] discharge papers family revealed.   In the ED, noted to have /73, HR 81, 100% saturation. Elevated WBC to 15.3, HAGMA with lactate of 10.5 on VBG, Pro-BNP of 5819. He had ETT changed and was then noted to not have lung sounds on the right side. Was found to have moderate sized pneumo on CXR, now s/p right-sided chest tube with reinflation of lung seen on CXR. Patient was sedated on fent and admitted to CICU for further management s/p VF arrest.      1- YESICA  2- CAD  3- pneumothorax  4- s/p V fib cardiac arrest  5- respiratory failure     pt with oliguria. will need dialysis soon if lytes do not improve and pt remains oliguric . awaiting family to decide   cont lidocaine drip for V fib arrest  vent support  strict I/O  chest tube   hypotension if recurs can attempt neosynephrine drip   strict I/O  follow lytes   d/w CICU team

## 2023-02-05 NOTE — PROGRESS NOTE ADULT - SUBJECTIVE AND OBJECTIVE BOX
HPI:  87 year old man with history of HFrEF last EF 40-45%, Afib/Aflutter s/p ablation, high grade AV block s/p PPM, T2DM, CKD brought in by EMS following cardiac arrest at home. Was seated in chair when he suddenly collapsed as witnessed by his wife and was noted to not be breathing or have a pulse. Per wife he slumped in chair, did not fall and hit his head. Wife started CPR, EMS was contacted and upon arrival was assessed to be in VF. Was shocked twice, given three rounds of epi and had ROSC. Intubated in the field and arrived at the ED with pulse. Per family patient also recently admitted to [facility] for HF exacerbation, found to have PNA for which he was treated. Per wife, patient had checked his blood sugar prior to arrest and was 135. Patient's wife believes she called for EMS maybe 5-10 minutes after he went unresponsive.    In the ED, noted to have /73, HR 81, 100% saturation. Elevated WBC to 15.3, HAGMA with lactate of 10.5 on VBG, Pro-BNP of 5819. He had ETT changed and was then noted to not have lung sounds on the right side. Was found to have moderate sized pneumo on CXR, now s/p right-sided chest tube with reinflation of lung seen on CXR. Patient was sedated on fent and admitted to CICU for further management s/p VF arrest.   (03 Feb 2023 21:36)      Events:    Review Of Systems:  Constitutional: denies fever, chills, Fatigue   HEENT: denies Blurred vision, Eye Pain, Headache   Respiratory: denies Cough, Wheezing , Shortness of breath  Cardiovascular: denies Chest Pain, Palpitations,  BERMUDEZ   Gastrointestinal: denies Abdominal Pain, Diarrhea, Constipation   Genitourinary: denies Nocturia, Dysuria, Incontinence  Extremities: denies Swelling, Joint Pain  Neurologic: denies Focal deficit, Paresthesias, Syncope  Lymphatic: denies Swelling, Lymphadenopathy   Skin: denies Rash, Ecchymoses, Wounds   Psychiatry: denies Depression, Suicidal/Homicidal Ideation, anxiety  [X ] 10 point review of systems is otherwise negative except as mentioned above         Medications:  artificial  tears Solution 1 Drop(s) Both EYES every 6 hours  chlorhexidine 0.12% Liquid 15 milliLiter(s) Oral Mucosa every 12 hours  chlorhexidine 2% Cloths 1 Application(s) Topical at bedtime  dextrose 5%. 1000 milliLiter(s) IV Continuous <Continuous>  dextrose 5%. 1000 milliLiter(s) IV Continuous <Continuous>  dextrose 50% Injectable 25 Gram(s) IV Push once  dextrose 50% Injectable 12.5 Gram(s) IV Push once  dextrose 50% Injectable 25 Gram(s) IV Push once  dextrose Oral Gel 15 Gram(s) Oral once PRN  glucagon  Injectable 1 milliGRAM(s) IntraMuscular once  heparin  Infusion 800 Unit(s)/Hr IV Continuous <Continuous>  insulin lispro (ADMELOG) corrective regimen sliding scale   SubCutaneous every 6 hours  insulin NPH human recombinant 3 Unit(s) SubCutaneous every 6 hours  lidocaine   Infusion 1 mG/Min IV Continuous <Continuous>  propofol Infusion 25 MICROgram(s)/kG/Min IV Continuous <Continuous>  sevelamer carbonate Powder 800 milliGRAM(s) Enteral Tube three times a day with meals    PMH/PSH/FH/SH: [ ] Unchanged  Vitals:  T(C): 37.3 (02-05-23 @ 03:00), Max: 37.7 (02-04-23 @ 07:00)  HR: 117 (02-05-23 @ 06:00) (99 - 118)  BP: 110/69 (02-04-23 @ 07:15) (110/69 - 110/69)  BP(mean): 86 (02-04-23 @ 07:15) (86 - 86)  RR: 30 (02-05-23 @ 06:00) (18 - 39)  SpO2: 98% (02-05-23 @ 06:00) (97% - 100%)  Wt(kg): --  Daily     Daily   I&O's Summary    03 Feb 2023 07:01  -  04 Feb 2023 07:00  --------------------------------------------------------  IN: 1739.8 mL / OUT: 85 mL / NET: 1654.8 mL    04 Feb 2023 07:01  -  05 Feb 2023 06:57  --------------------------------------------------------  IN: 763.5 mL / OUT: 47 mL / NET: 716.5 mL        Physical Exam:  Appearance: [ ] Normal [ ] NAD  Eyes: [ ] PERRL [ ] EOMI  HENT: [ ] Normal oral muscosa [ ]NC/AT  Cardiovascular: [ ] S1 [ ] S2 [ ] RRR [ ] No m/r/g [ ]No edema [ ] JVP  Procedural Access Site: [ ] No hematoma [ ] Non-tender to palpation [ ] 2+ pulse [ ] No bruit [ ] No Ecchymosis  Respiratory: [ ] Clear to auscultation bilaterally  Gastrointestinal: [ ] Soft [ ] Non-tender [ ] Non-distended [ ] BS+  Musculoskeletal: [ ] No clubbing [ ] No joint deformity   Neurologic: [ ] Non-focal  Lymphatic: [ ] No lymphadenopathy  Psychiatry: [ ] AAOx3 [ ] Mood & affect appropriate  Skin: [ ] No rashes [ ] No ecchymoses [ ] No cyanosis    02-05    133<L>  |  92<L>  |  64<H>  ----------------------------<  231<H>  4.5   |  21<L>  |  4.40<H>    Ca    9.0      05 Feb 2023 03:06  Phos  6.9     02-05  Mg     2.2     02-05    TPro  6.8  /  Alb  3.3  /  TBili  0.4  /  DBili  x   /  AST  94<H>  /  ALT  111<H>  /  AlkPhos  90  02-05    PT/INR - ( 05 Feb 2023 03:07 )   PT: 15.4 sec;   INR: 1.34 ratio         PTT - ( 05 Feb 2023 03:07 )  PTT:41.4 sec  CARDIAC MARKERS ( 03 Feb 2023 20:19 )  x     / x     / x     / x     / 1.7 ng/mL      Serum Pro-Brain Natriuretic Peptide: 5819 pg/mL (02-03 @ 20:19)          ECG:    Echo:    Stress Testing:     Cath:    Imaging:    Interpretation of Telemetry:      Assessment:       Neuro/Psych:    Cardiovascular    Lines:    Pulmonary    Gastrointestinal    Genitourinary    Renal    ID    Hematological    Endocrine                  HPI:  87 year old man with history of HFrEF last EF 40-45%, Afib/Aflutter s/p ablation, high grade AV block s/p PPM, T2DM, CKD brought in by EMS following cardiac arrest at home. Was seated in chair when he suddenly collapsed as witnessed by his wife and was noted to not be breathing or have a pulse. Per wife he slumped in chair, did not fall and hit his head. Wife started CPR, EMS was contacted and upon arrival was assessed to be in VF. Was shocked twice, given three rounds of epi and had ROSC. Intubated in the field and arrived at the ED with pulse. Per family patient also recently admitted to [facility] for HF exacerbation, found to have PNA for which he was treated. Per wife, patient had checked his blood sugar prior to arrest and was 135. Patient's wife believes she called for EMS maybe 5-10 minutes after he went unresponsive.    In the ED, noted to have /73, HR 81, 100% saturation. Elevated WBC to 15.3, HAGMA with lactate of 10.5 on VBG, Pro-BNP of 5819. He had ETT changed and was then noted to not have lung sounds on the right side. Was found to have moderate sized pneumo on CXR, now s/p right-sided chest tube with reinflation of lung seen on CXR. Patient was sedated on fent and admitted to CICU for further management s/p VF arrest.   (03 Feb 2023 21:36)    Events: EEG indicated a per prognosis per discussion w epileptologist.     Review Of Systems:  Constitutional: denies fever, chills, Fatigue   HEENT: denies Blurred vision, Eye Pain, Headache   Respiratory: denies Cough, Wheezing , Shortness of breath  Cardiovascular: denies Chest Pain, Palpitations,  BERMUDEZ   Gastrointestinal: denies Abdominal Pain, Diarrhea, Constipation   Genitourinary: denies Nocturia, Dysuria, Incontinence  Extremities: denies Swelling, Joint Pain  Neurologic: denies Focal deficit, Paresthesias, Syncope  Lymphatic: denies Swelling, Lymphadenopathy   Skin: denies Rash, Ecchymoses, Wounds   Psychiatry: denies Depression, Suicidal/Homicidal Ideation, anxiety  [X ] 10 point review of systems is otherwise negative except as mentioned above         Medications:  artificial  tears Solution 1 Drop(s) Both EYES every 6 hours  chlorhexidine 0.12% Liquid 15 milliLiter(s) Oral Mucosa every 12 hours  chlorhexidine 2% Cloths 1 Application(s) Topical at bedtime  dextrose 5%. 1000 milliLiter(s) IV Continuous <Continuous>  dextrose 5%. 1000 milliLiter(s) IV Continuous <Continuous>  dextrose 50% Injectable 25 Gram(s) IV Push once  dextrose 50% Injectable 12.5 Gram(s) IV Push once  dextrose 50% Injectable 25 Gram(s) IV Push once  dextrose Oral Gel 15 Gram(s) Oral once PRN  glucagon  Injectable 1 milliGRAM(s) IntraMuscular once  heparin  Infusion 800 Unit(s)/Hr IV Continuous <Continuous>  insulin lispro (ADMELOG) corrective regimen sliding scale   SubCutaneous every 6 hours  insulin NPH human recombinant 3 Unit(s) SubCutaneous every 6 hours  lidocaine   Infusion 1 mG/Min IV Continuous <Continuous>  propofol Infusion 25 MICROgram(s)/kG/Min IV Continuous <Continuous>  sevelamer carbonate Powder 800 milliGRAM(s) Enteral Tube three times a day with meals    Vitals:  T(C): 37.3 (02-05-23 @ 03:00), Max: 37.7 (02-04-23 @ 07:00)  HR: 117 (02-05-23 @ 06:00) (99 - 118)  BP: 110/69 (02-04-23 @ 07:15) (110/69 - 110/69)  BP(mean): 86 (02-04-23 @ 07:15) (86 - 86)  RR: 30 (02-05-23 @ 06:00) (18 - 39)  SpO2: 98% (02-05-23 @ 06:00) (97% - 100%)    Daily     Daily   I&O's Summary    03 Feb 2023 07:01  -  04 Feb 2023 07:00  --------------------------------------------------------  IN: 1739.8 mL / OUT: 85 mL / NET: 1654.8 mL    04 Feb 2023 07:01  -  05 Feb 2023 06:57  --------------------------------------------------------  IN: 763.5 mL / OUT: 47 mL / NET: 716.5 mL    Physical Exam:  Appearance: [ X] Normal [X ] NAD  Eyes: [X ] PERRL [ X] EOMI  HENT: [X ] Normal oral muscosa [X ]NC/AT  Cardiovascular: [ X] S1 [X ] S2 [ X] RRR. No edema, no JVD  Procedural Access Site: [ ] No hematoma [ ] Non-tender to palpation [ ] 2+ pulse [ ] No bruit [ ] No Ecchymosis  Respiratory: [ ] Clear to auscultation bilaterally  Gastrointestinal: [ ] Soft [ ] Non-tender [ ] Non-distended [ ] BS+  Musculoskeletal: [ ] No clubbing [ ] No joint deformity   Neurologic: [ ] Non-focal  Lymphatic: [ ] No lymphadenopathy  Psychiatry: [ ] AAOx3 [ ] Mood & affect appropriate  Skin: [ ] No rashes [ ] No ecchymoses [ ] No cyanosis    02-05    133<L>  |  92<L>  |  64<H>  ----------------------------<  231<H>  4.5   |  21<L>  |  4.40<H>    Ca    9.0      05 Feb 2023 03:06  Phos  6.9     02-05  Mg     2.2     02-05    TPro  6.8  /  Alb  3.3  /  TBili  0.4  /  DBili  x   /  AST  94<H>  /  ALT  111<H>  /  AlkPhos  90  02-05    PT/INR - ( 05 Feb 2023 03:07 )   PT: 15.4 sec;   INR: 1.34 ratio         PTT - ( 05 Feb 2023 03:07 )  PTT:41.4 sec  CARDIAC MARKERS ( 03 Feb 2023 20:19 )  x     / x     / x     / x     / 1.7 ng/mL      Serum Pro-Brain Natriuretic Peptide: 5819 pg/mL (02-03 @ 20:19)          ECG:    Echo:    Stress Testing:     Cath:    Imaging:    Interpretation of Telemetry:      Assessment:       Neuro/Psych:    Cardiovascular    Lines:    Pulmonary    Gastrointestinal    Genitourinary    Renal    ID    Hematological    Endocrine                  HPI:  87 year old man with history of HFrEF last EF 40-45%, Afib/Aflutter s/p ablation, high grade AV block s/p PPM, T2DM, CKD brought in by EMS following cardiac arrest at home. Was seated in chair when he suddenly collapsed as witnessed by his wife and was noted to not be breathing or have a pulse. Per wife he slumped in chair, did not fall and hit his head. Wife started CPR, EMS was contacted and upon arrival was assessed to be in VF. Was shocked twice, given three rounds of epi and had ROSC. Intubated in the field and arrived at the ED with pulse. Per family patient also recently admitted to [facility] for HF exacerbation, found to have PNA for which he was treated. Per wife, patient had checked his blood sugar prior to arrest and was 135. Patient's wife believes she called for EMS maybe 5-10 minutes after he went unresponsive.    In the ED, noted to have /73, HR 81, 100% saturation. Elevated WBC to 15.3, HAGMA with lactate of 10.5 on VBG, Pro-BNP of 5819. He had ETT changed and was then noted to not have lung sounds on the right side. Was found to have moderate sized pneumo on CXR, now s/p right-sided chest tube with reinflation of lung seen on CXR. Patient was sedated on fent and admitted to CICU for further management s/p VF arrest.   (03 Feb 2023 21:36)    Events: EEG indicated a per prognosis per discussion w epileptologist.     Review Of Systems:  Constitutional: denies fever, chills, Fatigue   HEENT: denies Blurred vision, Eye Pain, Headache   Respiratory: denies Cough, Wheezing , Shortness of breath  Cardiovascular: denies Chest Pain, Palpitations,  BERMUDEZ   Gastrointestinal: denies Abdominal Pain, Diarrhea, Constipation   Genitourinary: denies Nocturia, Dysuria, Incontinence  Extremities: denies Swelling, Joint Pain  Neurologic: denies Focal deficit, Paresthesias, Syncope  Lymphatic: denies Swelling, Lymphadenopathy   Skin: denies Rash, Ecchymoses, Wounds   Psychiatry: denies Depression, Suicidal/Homicidal Ideation, anxiety  [X ] 10 point review of systems is otherwise negative except as mentioned above         Medications:  artificial  tears Solution 1 Drop(s) Both EYES every 6 hours  chlorhexidine 0.12% Liquid 15 milliLiter(s) Oral Mucosa every 12 hours  chlorhexidine 2% Cloths 1 Application(s) Topical at bedtime  dextrose 5%. 1000 milliLiter(s) IV Continuous <Continuous>  dextrose 5%. 1000 milliLiter(s) IV Continuous <Continuous>  dextrose 50% Injectable 25 Gram(s) IV Push once  dextrose 50% Injectable 12.5 Gram(s) IV Push once  dextrose 50% Injectable 25 Gram(s) IV Push once  dextrose Oral Gel 15 Gram(s) Oral once PRN  glucagon  Injectable 1 milliGRAM(s) IntraMuscular once  heparin  Infusion 800 Unit(s)/Hr IV Continuous <Continuous>  insulin lispro (ADMELOG) corrective regimen sliding scale   SubCutaneous every 6 hours  insulin NPH human recombinant 3 Unit(s) SubCutaneous every 6 hours  lidocaine   Infusion 1 mG/Min IV Continuous <Continuous>  propofol Infusion 25 MICROgram(s)/kG/Min IV Continuous <Continuous>  sevelamer carbonate Powder 800 milliGRAM(s) Enteral Tube three times a day with meals    Vitals:  T(C): 37.3 (02-05-23 @ 03:00), Max: 37.7 (02-04-23 @ 07:00)  HR: 117 (02-05-23 @ 06:00) (99 - 118)  BP: 110/69 (02-04-23 @ 07:15) (110/69 - 110/69)  BP(mean): 86 (02-04-23 @ 07:15) (86 - 86)  RR: 30 (02-05-23 @ 06:00) (18 - 39)  SpO2: 98% (02-05-23 @ 06:00) (97% - 100%)    Daily     Daily   I&O's Summary    03 Feb 2023 07:01  -  04 Feb 2023 07:00  --------------------------------------------------------  IN: 1739.8 mL / OUT: 85 mL / NET: 1654.8 mL    04 Feb 2023 07:01  -  05 Feb 2023 06:57  --------------------------------------------------------  IN: 763.5 mL / OUT: 47 mL / NET: 716.5 mL    Physical Exam:  Appearance: [ X] Normal [X ] NAD  Eyes: [X ] PERRL [ X] EOMI  HENT: [X ] Normal oral muscosa [X ]NC/AT  Cardiovascular: [ X] S1 [X ] S2 [ X] RRR. No edema, no JVD  Respiratory: [ X] Clear to auscultation bilaterally  Gastrointestinal: [X ] Soft [X ] Non-tender [X ] Non-distended [ ] BS+  Musculoskeletal: [X ] No clubbing [X ] No joint deformity   Neurologic: [X ] Non-focal  Lymphatic: [ X] No lymphadenopathy  Psychiatry: X[ ] AAOx3 [X ] Mood & affect appropriate  Skin: [X ] No rashes [ X] No ecchymoses [ X] No cyanosis    02-05    133<L>  |  92<L>  |  64<H>  ----------------------------<  231<H>  4.5   |  21<L>  |  4.40<H>    Ca    9.0      05 Feb 2023 03:06  Phos  6.9     02-05  Mg     2.2     02-05    TPro  6.8  /  Alb  3.3  /  TBili  0.4  /  DBili  x   /  AST  94<H>  /  ALT  111<H>  /  AlkPhos  90  02-05    PT/INR - ( 05 Feb 2023 03:07 )   PT: 15.4 sec;   INR: 1.34 ratio       PTT - ( 05 Feb 2023 03:07 )  PTT:41.4 sec  CARDIAC MARKERS ( 03 Feb 2023 20:19 )  x     / x     / x     / x     / 1.7 ng/mL    Serum Pro-Brain Natriuretic Peptide: 5819 pg/mL (02-03 @ 20:19)    ECG: < from: 12 Lead ECG (02.03.23 @ 23:08) >  Diagnosis Line SINUS TACHCYARDIA WTH  VENTRICULAR PACING AND PACEMAKER WENCHKEBACH    Echo: < from: TTE with Doppler (w/Cont) (02.04.23 @ 13:28) >  Endocardial visualization enhanced with intravenous  injection of Ultrasonic Enhancing Agent (Definity).  Normal left ventricular internal dimensions. Severely  reduced left ventricular systolic function.  Normal right ventricular size with decreased right  ventricular systolic function.  A catheter is noted in the right heart.  Color Doppler demonstrates evidence of a patent foramen  ovale.    Imaging: < from: Xray Chest 1 View- PORTABLE-Routine (Xray Chest 1 View- PORTABLE-Routine in AM.) (02.05.23 @ 04:04) >  IMPRESSION: The ET tube and NG tube are well-positioned. There is   left-sided pacemaker. There is a right-sided chest tube. There is no   pneumothorax.    Interpretation of Telemetry: V paced 67    ====================ASSESSMENT ==============  87 year old man with history of HFrEF last EF 40-45%, Afib/Aflutter s/p ablation, high grade AV block s/p PPM, T2DM, CKD brought in by EMS following cardiac arrest at home. Found to have VF s/p defib x 2, epi x 3 with ROSC and with course complicated by right-sided pneumothorax s/p chest tube. Patient admitted to CICU for further management post VF arrest.      Plan:  ====================== NEUROLOGY=====================  - Sedated on fentanyl in the ED, transitioned to prop gtt . EEG was indicative of a very poor prognosis per epileptologist. This was explained to the family and the wife has decided to withdraw care and focus on comofrt. Morphine gtt started. Prop discontinued.     ==================== RESPIRATORY======================  S/P Witnessed cardiac arrest at home, Intubated in the field for airway protection s/p VFib arrest. Tube exchanged while in the ED.  - Current vent settings: 16/400/6/30  - Oxygenating well, will plan for a palliative extubation when the family is ready.    Right-sided Pneumothorax s/p intubation and CPR   S/P pigtail catheter with reinflation, residual pneumo on CXR., malpositioned during transfer   - New right chest tube placed at bedside by surgery   - Daily CXR     ====================CARDIOVASCULAR==================  Admitted w/ ?VT/VFib arrest , S/P defib x2, epi x3  with EMS  - Monitor on tele  - Lidocaine discontinued    Hx of AFib/Flutter   - Heparin discontinued    HFrEF  - IVC 1.8 on POCUS given 500 cc bolus    WIll not continued any further blood draws. Palliative to see in AM. Plan for palliative extubation when family is ready.

## 2023-02-05 NOTE — PROGRESS NOTE ADULT - ATTENDING COMMENTS
Recent admission to [facility] for pneumonia.   Cardiac arrest at home, reportedly VF requiring external shocks.   Chest tube in place for pneumothorax secondary to CPR.    Lidocaine gtt for VT  Ventilatory support  YESICA on CKD - consider HD for uremia. Nephrology consulted.    Ongoing goals of care discussion with patient's wife and children at bedside.    In light of EEG findings and poor overall prognosis for full neurologic recovery, patient's family has expressed that his wishes would be to withdraw ventilatory support and provide comfort measures for end of life care.  Palliative Care consultation requested.    Emotional support provided to patient's wife, children, and grandchildren at bedside.
Recent admission to [facility] for pneumonia.   Cardiac arrest at home, reportedly VF requiring external shocks.   Chest tube in place for pneumothorax secondary to CPR.    Lidocaine gtt for VT  Ventilatory support  YESICA on CKD - consider HD for uremia. Nephrology consulted.    Possible ischemic evaluation if neurological status improves to baseline.      Ongoing goals of care discussion with patient's wife and children at bedside.

## 2023-02-05 NOTE — PROGRESS NOTE ADULT - SUBJECTIVE AND OBJECTIVE BOX
SUNITA LINDSAY  MRN-[id number]  Patient is a 87y old  Male who presents with a chief complaint of VFib (2023 12:35)    HPI:  87 year old man with history of HFrEF last EF 40-45%, Afib/Aflutter s/p ablation, high grade AV block s/p PPM, T2DM, CKD brought in by EMS following cardiac arrest at home. Was seated in chair when he suddenly collapsed as witnessed by his wife and was noted to not be breathing or have a pulse. Per wife he slumped in chair, did not fall and hit his head. Wife started CPR, EMS was contacted and upon arrival was assessed to be in VF. Was shocked twice, given three rounds of epi and had ROSC. Intubated in the field and arrived at the ED with pulse. Per family patient also recently admitted to [facility] for HF exacerbation, found to have PNA for which he was treated. Per wife, patient had checked his blood sugar prior to arrest and was 135. Patient's wife believes she called for EMS maybe 5-10 minutes after he went unresponsive.    In the ED, noted to have /73, HR 81, 100% saturation. Elevated WBC to 15.3, HAGMA with lactate of 10.5 on VBG, Pro-BNP of 5819. He had ETT changed and was then noted to not have lung sounds on the right side. Was found to have moderate sized pneumo on CXR, now s/p right-sided chest tube with reinflation of lung seen on CXR. Patient was sedated on fent and admitted to CICU for further management s/p VF arrest.   (2023 21:36)      24 hr events: family decided to withdraw care today given EEG results and no signs of neurological improvement. Pt is now on comfort measures only.     REVIEW OF SYSTEMS: GOSIA      Physical Exam:  Vital Signs Last 24 Hrs  T(C): 37.2 (2023 08:00), Max: 37.5 (2023 20:00)  T(F): 98.9 (2023 08:00), Max: 99.5 (2023 20:00)  HR: 98 (2023 13:00) (88 - 117)  RR: 29 (2023 13:00) (20 - 39)  SpO2: 100% (2023 13:00) (97% - 100%)    Parameters below as of 2023 10:00  Patient On (Oxygen Delivery Method): ventilator    O2 Concentration (%): 30    ============================I/O===========================   I&O's Detail    2023 07:01  -  2023 07:00  --------------------------------------------------------  IN:    Glucerna 1.5: 270 mL    Heparin: 192 mL    IV PiggyBack: 50 mL    Lidocaine: 180 mL    Propofol: 121 mL  Total IN: 813 mL    OUT:    Chest Tube (mL): 20 mL    Indwelling Catheter - Urethral (mL): 27 mL  Total OUT: 47 mL    Total NET: 766 mL      2023 07:01  -  2023 19:39  --------------------------------------------------------  IN:    Glucerna 1.5: 120 mL    Heparin: 48 mL    IV PiggyBack: 500 mL    Lidocaine: 30 mL  Total IN: 698 mL    OUT:    Indwelling Catheter - Urethral (mL): 5 mL    Propofol: 0 mL  Total OUT: 5 mL    Total NET: 693 mL        ============================ LABS =========================                        12.6   17.78 )-----------( 311      ( 2023 03:07 )             38.8     02-05    133<L>  |  92<L>  |  64<H>  ----------------------------<  231<H>  4.5   |  21<L>  |  4.40<H>    Ca    9.0      2023 03:06  Phos  6.9     02-  Mg     2.2     02-    TPro  6.8  /  Alb  3.3  /  TBili  0.4  /  DBili  x   /  AST  94<H>  /  ALT  111<H>  /  AlkPhos  90  02-05    LIVER FUNCTIONS - ( 2023 03:06 )  Alb: 3.3 g/dL / Pro: 6.8 g/dL / ALK PHOS: 90 U/L / ALT: 111 U/L / AST: 94 U/L / GGT: x           PT/INR - ( 2023 12:34 )   PT: 17.2 sec;   INR: 1.49 ratio         PTT - ( 2023 12:34 )  PTT:75.2 sec  ABG - ( 2023 12:33 )  pH, Arterial: 7.42  pH, Blood: x     /  pCO2: 34    /  pO2: 139   / HCO3: 22    / Base Excess: -1.8  /  SaO2: 99.9              Urinalysis Basic - ( 2023 07:22 )    Color: Light Yellow / Appearance: Clear / S.011 / pH: x  Gluc: x / Ketone: Negative  / Bili: Negative / Urobili: Negative   Blood: x / Protein: 30 mg/dL / Nitrite: Negative   Leuk Esterase: Negative / RBC: 21 /hpf / WBC 3 /HPF   Sq Epi: x / Non Sq Epi: 3 /hpf / Bacteria: Negative      ======================Micro/Rad/Cardio=================  Culture: Reviewed   CXR: Reviewed  Echo:Reviewed  ======================================================  PAST MEDICAL & SURGICAL HISTORY:  Congestive heart failure (CHF)        ====================ASSESSMENT ==============  87 year old man with history of HFrEF last EF 40-45%, Afib/Aflutter s/p ablation, high grade AV block s/p PPM, T2DM, CKD brought in by EMS following cardiac arrest at home. Found to have VF s/p defib x 2, epi x 3 with ROSC and with course complicated by right-sided pneumothorax s/p chest tube. Patient admitted to CICU for further management post VF arrest.    ====================== NEUROLOGY=====================  - Sedated on fentanyl in the ED, transitioned to prop gtt .  - EEG was indicative of a very poor prognosis per epileptologist. This was explained to the family and the wife has decided to withdraw care and focus on comfort   - Morphine gtt started. Prop discontinued.     ==================== RESPIRATORY======================  S/P Witnessed cardiac arrest at home, Intubated in the field for airway protection s/p VFib arrest. Tube exchanged while in the ED.  - S/p palliative extubation 23    Right-sided Pneumothorax s/p intubation and CPR   S/P pigtail catheter with reinflation, residual pneumo on CXR., malpositioned during transfer   - New right chest tube placed at bedside by surgery   - Daily CXR     ====================CARDIOVASCULAR==================  Comfort care only    Admitted w/ ?VT/VFib arrest , S/P defib x2, epi x3  with EMS  - Monitor on tele  - Lidocaine discontinued    Hx of AFib/Flutter   - Heparin discontinued    HFrEF  - IVC 1.8 on POCUS given 500 cc bolus  =======ETHICS==========  Comfort care  - per family, pt now comfort measures only   - no further blood draws  - c/w morphine gtt  - ativan pushes and scopolamine PRN  - palliative to see pt in AM      Patient requires continuous monitoring with bedside rhythm monitoring, pulse ox monitoring, and intermittent blood gas analysis. Care plan discussed with ICU care team. Patient remained critical and at risk for life threatening decompensation.  Patient seen, examined and plan discussed with CCU team during rounds.     I have personally provided 35 minutes of critical care time excluding time spent on separate procedures, in addition to initial critical care time provided by the CICU Attending, Dr. Richards

## 2023-02-05 NOTE — PROGRESS NOTE ADULT - ASSESSMENT
87 year old man with history of HFrEF last EF 40-45%, Afib/Aflutter s/p ablation, high grade AV block s/p [place] [place] dual camber PPM, CAD (s/p 14 stents as per report), T2DM, CKD brought in by EMS following cardiac arrest at home.     Patient had a reported VF arrest (no strips available) and required 2 external shocks and 3 rounds of epinephrine before ROSC was achieved. Interrogation of the device shows high ventricular rate concerning for VT/VF at rates of 200s aligning with the time of onset of reported arrest. He was recently admitted to [facility] with PNA. Patient currently has a chest tube in place for a pneumothorax as a c/b of CPR.     ECG: A sensed V-paced  Telemetry monitoring: V-paced rhythm     Recommendations    #VT/VF arrest  - Can attempt to wean off lidocaine  - Family appears to want conservative management

## 2023-02-09 LAB
CULTURE RESULTS: SIGNIFICANT CHANGE UP
CULTURE RESULTS: SIGNIFICANT CHANGE UP
SPECIMEN SOURCE: SIGNIFICANT CHANGE UP
SPECIMEN SOURCE: SIGNIFICANT CHANGE UP